# Patient Record
Sex: MALE | Race: WHITE | Employment: FULL TIME | URBAN - NONMETROPOLITAN AREA
[De-identification: names, ages, dates, MRNs, and addresses within clinical notes are randomized per-mention and may not be internally consistent; named-entity substitution may affect disease eponyms.]

---

## 2017-01-06 ENCOUNTER — HOSPITAL ENCOUNTER (OUTPATIENT)
Dept: GENERAL RADIOLOGY | Age: 47
Discharge: HOME | End: 2017-01-06
Payer: COMMERCIAL

## 2017-01-06 ENCOUNTER — OFFICE VISIT (OUTPATIENT)
Dept: FAMILY MEDICINE CLINIC | Age: 47
End: 2017-01-06
Payer: COMMERCIAL

## 2017-01-06 VITALS
SYSTOLIC BLOOD PRESSURE: 130 MMHG | HEIGHT: 72 IN | WEIGHT: 199 LBS | RESPIRATION RATE: 16 BRPM | TEMPERATURE: 98.3 F | HEART RATE: 91 BPM | OXYGEN SATURATION: 98 % | DIASTOLIC BLOOD PRESSURE: 86 MMHG | BODY MASS INDEX: 26.95 KG/M2

## 2017-01-06 DIAGNOSIS — M25.511 ACUTE PAIN OF RIGHT SHOULDER: Primary | ICD-10-CM

## 2017-01-06 DIAGNOSIS — M54.2 NECK PAIN ON RIGHT SIDE: ICD-10-CM

## 2017-01-06 DIAGNOSIS — M25.511 ACUTE PAIN OF RIGHT SHOULDER: ICD-10-CM

## 2017-01-06 PROCEDURE — 99213 OFFICE O/P EST LOW 20 MIN: CPT | Performed by: NURSE PRACTITIONER

## 2017-01-06 PROCEDURE — 72040 X-RAY EXAM NECK SPINE 2-3 VW: CPT

## 2017-01-06 RX ORDER — METHYLPREDNISOLONE 4 MG/1
TABLET ORAL
Qty: 1 KIT | Refills: 0 | Status: SHIPPED | OUTPATIENT
Start: 2017-01-06 | End: 2017-01-13

## 2017-01-06 RX ORDER — OXCARBAZEPINE 600 MG/1
600 TABLET, FILM COATED ORAL 2 TIMES DAILY
COMMUNITY
End: 2017-01-13 | Stop reason: DRUGHIGH

## 2017-01-10 DIAGNOSIS — M50.30 DEGENERATIVE CERVICAL DISC: ICD-10-CM

## 2017-01-10 DIAGNOSIS — M25.511 ACUTE PAIN OF RIGHT SHOULDER: Primary | ICD-10-CM

## 2017-01-13 ENCOUNTER — HOSPITAL ENCOUNTER (OUTPATIENT)
Dept: GENERAL RADIOLOGY | Age: 47
Discharge: HOME | End: 2017-01-13
Payer: OTHER GOVERNMENT

## 2017-01-13 ENCOUNTER — OFFICE VISIT (OUTPATIENT)
Dept: FAMILY MEDICINE CLINIC | Age: 47
End: 2017-01-13
Payer: OTHER GOVERNMENT

## 2017-01-13 VITALS
HEART RATE: 86 BPM | DIASTOLIC BLOOD PRESSURE: 92 MMHG | SYSTOLIC BLOOD PRESSURE: 140 MMHG | RESPIRATION RATE: 16 BRPM | HEIGHT: 72 IN | BODY MASS INDEX: 26.95 KG/M2 | WEIGHT: 199 LBS | OXYGEN SATURATION: 97 % | TEMPERATURE: 97.8 F

## 2017-01-13 DIAGNOSIS — M25.511 ACUTE PAIN OF RIGHT SHOULDER: ICD-10-CM

## 2017-01-13 DIAGNOSIS — M54.2 NECK PAIN, ACUTE: Primary | ICD-10-CM

## 2017-01-13 PROCEDURE — 73030 X-RAY EXAM OF SHOULDER: CPT

## 2017-01-13 PROCEDURE — 99213 OFFICE O/P EST LOW 20 MIN: CPT | Performed by: NURSE PRACTITIONER

## 2017-01-13 PROCEDURE — 96372 THER/PROPH/DIAG INJ SC/IM: CPT | Performed by: NURSE PRACTITIONER

## 2017-01-13 RX ORDER — DICLOFENAC POTASSIUM 50 MG/1
50 TABLET, FILM COATED ORAL 3 TIMES DAILY
Qty: 90 TABLET | Refills: 3 | Status: SHIPPED | OUTPATIENT
Start: 2017-01-13 | End: 2017-01-20

## 2017-01-13 RX ORDER — OMEPRAZOLE 20 MG/1
20 CAPSULE, DELAYED RELEASE ORAL DAILY
Qty: 30 CAPSULE | Refills: 3 | Status: SHIPPED | OUTPATIENT
Start: 2017-01-13 | End: 2019-09-29

## 2017-01-13 RX ORDER — ACETAMINOPHEN AND CODEINE PHOSPHATE 300; 60 MG/1; MG/1
1 TABLET ORAL EVERY 4 HOURS PRN
COMMUNITY
End: 2017-01-13 | Stop reason: ALTCHOICE

## 2017-01-13 RX ORDER — METHYLPREDNISOLONE 4 MG/1
TABLET ORAL
Qty: 1 KIT | Refills: 0 | Status: SHIPPED | OUTPATIENT
Start: 2017-01-13 | End: 2017-01-13 | Stop reason: SDUPTHER

## 2017-01-13 RX ORDER — TRIAMCINOLONE ACETONIDE 40 MG/ML
60 INJECTION, SUSPENSION INTRA-ARTICULAR; INTRAMUSCULAR ONCE
Status: COMPLETED | OUTPATIENT
Start: 2017-01-13 | End: 2017-01-13

## 2017-01-13 RX ORDER — GABAPENTIN 300 MG/1
300 CAPSULE ORAL NIGHTLY
Qty: 30 CAPSULE | Refills: 0 | Status: SHIPPED | OUTPATIENT
Start: 2017-01-13 | End: 2017-01-13 | Stop reason: SDUPTHER

## 2017-01-13 RX ORDER — DEXAMETHASONE SODIUM PHOSPHATE 4 MG/ML
4 INJECTION, SOLUTION INTRA-ARTICULAR; INTRALESIONAL; INTRAMUSCULAR; INTRAVENOUS; SOFT TISSUE ONCE
Status: COMPLETED | OUTPATIENT
Start: 2017-01-13 | End: 2017-01-13

## 2017-01-13 RX ORDER — METHYLPREDNISOLONE 4 MG/1
TABLET ORAL
Qty: 1 KIT | Refills: 0 | Status: SHIPPED | OUTPATIENT
Start: 2017-01-13 | End: 2017-01-20 | Stop reason: ALTCHOICE

## 2017-01-13 RX ORDER — DICLOFENAC POTASSIUM 50 MG/1
50 TABLET, FILM COATED ORAL 3 TIMES DAILY
Qty: 90 TABLET | Refills: 3 | Status: SHIPPED | OUTPATIENT
Start: 2017-01-13 | End: 2017-01-13 | Stop reason: SDUPTHER

## 2017-01-13 RX ORDER — OXCARBAZEPINE 600 MG/1
600 TABLET, FILM COATED ORAL DAILY
Qty: 60 TABLET | Status: SHIPPED | COMMUNITY
Start: 2017-01-13 | End: 2017-02-24

## 2017-01-13 RX ORDER — HYDROCODONE BITARTRATE AND ACETAMINOPHEN 5; 325 MG/1; MG/1
1 TABLET ORAL EVERY 6 HOURS PRN
Qty: 30 TABLET | Refills: 0 | Status: SHIPPED | OUTPATIENT
Start: 2017-01-13 | End: 2017-01-20 | Stop reason: SDUPTHER

## 2017-01-13 RX ORDER — GABAPENTIN 300 MG/1
300 CAPSULE ORAL NIGHTLY
Qty: 30 CAPSULE | Refills: 0 | Status: SHIPPED | OUTPATIENT
Start: 2017-01-13 | End: 2017-02-10 | Stop reason: DRUGHIGH

## 2017-01-13 RX ADMIN — TRIAMCINOLONE ACETONIDE 60 MG: 40 INJECTION, SUSPENSION INTRA-ARTICULAR; INTRAMUSCULAR at 09:28

## 2017-01-13 RX ADMIN — DEXAMETHASONE SODIUM PHOSPHATE 4 MG: 4 INJECTION, SOLUTION INTRA-ARTICULAR; INTRALESIONAL; INTRAMUSCULAR; INTRAVENOUS; SOFT TISSUE at 09:27

## 2017-01-17 ENCOUNTER — TELEPHONE (OUTPATIENT)
Dept: FAMILY MEDICINE CLINIC | Age: 47
End: 2017-01-17

## 2017-01-20 ENCOUNTER — OFFICE VISIT (OUTPATIENT)
Dept: FAMILY MEDICINE CLINIC | Age: 47
End: 2017-01-20
Payer: OTHER GOVERNMENT

## 2017-01-20 VITALS
BODY MASS INDEX: 26.55 KG/M2 | HEART RATE: 84 BPM | RESPIRATION RATE: 16 BRPM | TEMPERATURE: 98.9 F | SYSTOLIC BLOOD PRESSURE: 136 MMHG | DIASTOLIC BLOOD PRESSURE: 88 MMHG | WEIGHT: 196 LBS | OXYGEN SATURATION: 98 % | HEIGHT: 72 IN

## 2017-01-20 DIAGNOSIS — M25.511 ACUTE PAIN OF RIGHT SHOULDER: Primary | ICD-10-CM

## 2017-01-20 DIAGNOSIS — M47.812 CERVICAL SPINE ARTHRITIS: ICD-10-CM

## 2017-01-20 PROCEDURE — 99213 OFFICE O/P EST LOW 20 MIN: CPT | Performed by: NURSE PRACTITIONER

## 2017-01-20 RX ORDER — GABAPENTIN 300 MG/1
300 CAPSULE ORAL 2 TIMES DAILY
Qty: 60 CAPSULE | Refills: 3 | Status: SHIPPED | OUTPATIENT
Start: 2017-01-20 | End: 2017-02-03 | Stop reason: SDUPTHER

## 2017-01-20 RX ORDER — HYDROCODONE BITARTRATE AND ACETAMINOPHEN 5; 325 MG/1; MG/1
1 TABLET ORAL EVERY 6 HOURS PRN
Qty: 30 TABLET | Refills: 0 | Status: SHIPPED | OUTPATIENT
Start: 2017-01-20 | End: 2017-01-27

## 2017-01-31 RX ORDER — HYDROCODONE BITARTRATE AND ACETAMINOPHEN 5; 325 MG/1; MG/1
1 TABLET ORAL EVERY 8 HOURS PRN
Qty: 30 TABLET | Refills: 0 | Status: SHIPPED | OUTPATIENT
Start: 2017-01-31 | End: 2017-02-07

## 2017-02-03 DIAGNOSIS — M25.511 ACUTE PAIN OF RIGHT SHOULDER: ICD-10-CM

## 2017-02-03 RX ORDER — GABAPENTIN 300 MG/1
300 CAPSULE ORAL 2 TIMES DAILY
Qty: 60 CAPSULE | Refills: 0 | Status: SHIPPED | OUTPATIENT
Start: 2017-02-03 | End: 2018-04-13

## 2017-02-10 ENCOUNTER — OFFICE VISIT (OUTPATIENT)
Dept: FAMILY MEDICINE CLINIC | Age: 47
End: 2017-02-10
Payer: OTHER GOVERNMENT

## 2017-02-10 VITALS
HEIGHT: 72 IN | RESPIRATION RATE: 16 BRPM | SYSTOLIC BLOOD PRESSURE: 126 MMHG | BODY MASS INDEX: 26.55 KG/M2 | WEIGHT: 196 LBS | OXYGEN SATURATION: 98 % | HEART RATE: 78 BPM | TEMPERATURE: 98.4 F | DIASTOLIC BLOOD PRESSURE: 88 MMHG

## 2017-02-10 DIAGNOSIS — M25.511 ACUTE PAIN OF RIGHT SHOULDER: Primary | ICD-10-CM

## 2017-02-10 PROCEDURE — 99212 OFFICE O/P EST SF 10 MIN: CPT | Performed by: NURSE PRACTITIONER

## 2017-02-10 RX ORDER — HYDROCODONE BITARTRATE AND ACETAMINOPHEN 7.5; 325 MG/1; MG/1
1 TABLET ORAL 2 TIMES DAILY PRN
Qty: 30 TABLET | Refills: 0 | Status: SHIPPED | OUTPATIENT
Start: 2017-02-10 | End: 2017-02-17

## 2017-02-10 ASSESSMENT — ENCOUNTER SYMPTOMS
ALLERGIC/IMMUNOLOGIC NEGATIVE: 1
EYES NEGATIVE: 1
GASTROINTESTINAL NEGATIVE: 1
RESPIRATORY NEGATIVE: 1

## 2017-02-17 ENCOUNTER — TELEPHONE (OUTPATIENT)
Dept: FAMILY MEDICINE CLINIC | Age: 47
End: 2017-02-17

## 2017-02-22 ENCOUNTER — TELEPHONE (OUTPATIENT)
Dept: FAMILY MEDICINE CLINIC | Age: 47
End: 2017-02-22

## 2017-02-24 ENCOUNTER — OFFICE VISIT (OUTPATIENT)
Dept: FAMILY MEDICINE CLINIC | Age: 47
End: 2017-02-24
Payer: OTHER GOVERNMENT

## 2017-02-24 VITALS
DIASTOLIC BLOOD PRESSURE: 88 MMHG | HEART RATE: 70 BPM | WEIGHT: 196 LBS | SYSTOLIC BLOOD PRESSURE: 128 MMHG | TEMPERATURE: 98.4 F | RESPIRATION RATE: 16 BRPM | OXYGEN SATURATION: 98 % | BODY MASS INDEX: 26.58 KG/M2

## 2017-02-24 DIAGNOSIS — Z79.899 MEDICATION MANAGEMENT: ICD-10-CM

## 2017-02-24 DIAGNOSIS — M54.12 RIGHT CERVICAL RADICULOPATHY: ICD-10-CM

## 2017-02-24 DIAGNOSIS — M47.12 SPONDYLOSIS OF CERVICAL SPINE WITH MYELOPATHY: Primary | ICD-10-CM

## 2017-02-24 LAB
AMPHETAMINE SCREEN, URINE: NEGATIVE
BARBITURATE SCREEN URINE: NEGATIVE
BENZODIAZEPINE SCREEN, URINE: NEGATIVE
CANNABINOID SCREEN URINE: POSITIVE
COCAINE METABOLITE SCREEN URINE: NEGATIVE
Lab: ABNORMAL
OPIATE SCREEN URINE: POSITIVE

## 2017-02-24 PROCEDURE — 99213 OFFICE O/P EST LOW 20 MIN: CPT | Performed by: NURSE PRACTITIONER

## 2017-02-24 RX ORDER — HYDROCODONE BITARTRATE AND ACETAMINOPHEN 7.5; 325 MG/1; MG/1
1 TABLET ORAL EVERY 12 HOURS PRN
Qty: 60 TABLET | Refills: 0 | Status: SHIPPED | OUTPATIENT
Start: 2017-02-24 | End: 2018-04-13

## 2017-02-24 RX ORDER — MELOXICAM 15 MG/1
15 TABLET ORAL DAILY
Qty: 30 TABLET | Refills: 3 | Status: SHIPPED | OUTPATIENT
Start: 2017-02-24 | End: 2017-03-03

## 2017-02-24 RX ORDER — HYDROCODONE BITARTRATE AND ACETAMINOPHEN 7.5; 325 MG/1; MG/1
1 TABLET ORAL EVERY 6 HOURS PRN
COMMUNITY
End: 2017-02-24 | Stop reason: SDUPTHER

## 2017-02-24 RX ORDER — METHYLPREDNISOLONE 4 MG/1
TABLET ORAL
Qty: 1 KIT | Refills: 0 | Status: SHIPPED | OUTPATIENT
Start: 2017-02-24 | End: 2017-03-03

## 2017-02-28 ENCOUNTER — TELEPHONE (OUTPATIENT)
Dept: FAMILY MEDICINE CLINIC | Age: 47
End: 2017-02-28

## 2017-02-28 DIAGNOSIS — M47.12 SPONDYLOSIS OF CERVICAL SPINE WITH MYELOPATHY: Primary | ICD-10-CM

## 2017-02-28 DIAGNOSIS — M54.12 RIGHT CERVICAL RADICULOPATHY: ICD-10-CM

## 2017-03-03 ENCOUNTER — OFFICE VISIT (OUTPATIENT)
Dept: NEUROLOGY | Age: 47
End: 2017-03-03
Payer: COMMERCIAL

## 2017-03-03 VITALS
SYSTOLIC BLOOD PRESSURE: 136 MMHG | DIASTOLIC BLOOD PRESSURE: 84 MMHG | OXYGEN SATURATION: 94 % | WEIGHT: 198.25 LBS | HEART RATE: 77 BPM | BODY MASS INDEX: 26.85 KG/M2 | HEIGHT: 72 IN

## 2017-03-03 DIAGNOSIS — R07.9 CHEST PAIN, UNSPECIFIED TYPE: ICD-10-CM

## 2017-03-03 DIAGNOSIS — M79.601 PAIN IN RIGHT ARM: Primary | ICD-10-CM

## 2017-03-03 DIAGNOSIS — M54.12 CERVICAL RADICULOPATHY: ICD-10-CM

## 2017-03-03 PROCEDURE — 99204 OFFICE O/P NEW MOD 45 MIN: CPT | Performed by: PSYCHIATRY & NEUROLOGY

## 2017-03-08 ENCOUNTER — TELEPHONE (OUTPATIENT)
Dept: NEUROLOGY | Age: 47
End: 2017-03-08

## 2017-03-13 ENCOUNTER — OFFICE VISIT (OUTPATIENT)
Dept: PRIMARY CARE CLINIC | Age: 47
End: 2017-03-13
Payer: OTHER GOVERNMENT

## 2017-03-13 VITALS
BODY MASS INDEX: 28.04 KG/M2 | HEIGHT: 72 IN | HEART RATE: 68 BPM | OXYGEN SATURATION: 96 % | RESPIRATION RATE: 18 BRPM | TEMPERATURE: 98 F | SYSTOLIC BLOOD PRESSURE: 138 MMHG | DIASTOLIC BLOOD PRESSURE: 88 MMHG | WEIGHT: 207 LBS

## 2017-03-13 DIAGNOSIS — M50.30 DEGENERATIVE DISC DISEASE, CERVICAL: Primary | ICD-10-CM

## 2017-03-13 PROCEDURE — 99202 OFFICE O/P NEW SF 15 MIN: CPT | Performed by: FAMILY MEDICINE

## 2017-03-15 ASSESSMENT — ENCOUNTER SYMPTOMS
COLOR CHANGE: 0
NAUSEA: 0
ABDOMINAL PAIN: 0
VOMITING: 0
BACK PAIN: 0

## 2017-09-18 ENCOUNTER — TRANSCRIBE ORDERS (OUTPATIENT)
Dept: ADMINISTRATIVE | Facility: HOSPITAL | Age: 47
End: 2017-09-18

## 2017-09-18 DIAGNOSIS — R51.9 GENERALIZED HEADACHES: Primary | ICD-10-CM

## 2017-09-21 ENCOUNTER — HOSPITAL ENCOUNTER (OUTPATIENT)
Dept: CT IMAGING | Facility: HOSPITAL | Age: 47
Discharge: HOME | End: 2017-09-21
Admitting: FAMILY MEDICINE

## 2017-09-21 DIAGNOSIS — R51.9 GENERALIZED HEADACHES: ICD-10-CM

## 2017-09-21 PROCEDURE — 70450 CT HEAD/BRAIN W/O DYE: CPT

## 2017-11-01 ENCOUNTER — OFFICE VISIT (OUTPATIENT)
Dept: PRIMARY CARE CLINIC | Age: 47
End: 2017-11-01
Payer: COMMERCIAL

## 2017-11-01 VITALS
DIASTOLIC BLOOD PRESSURE: 91 MMHG | HEART RATE: 97 BPM | TEMPERATURE: 97.2 F | HEIGHT: 72 IN | WEIGHT: 205.6 LBS | RESPIRATION RATE: 18 BRPM | BODY MASS INDEX: 27.85 KG/M2 | SYSTOLIC BLOOD PRESSURE: 120 MMHG | OXYGEN SATURATION: 98 %

## 2017-11-01 DIAGNOSIS — J06.9 URI, ACUTE: ICD-10-CM

## 2017-11-01 DIAGNOSIS — M25.511 ACUTE PAIN OF RIGHT SHOULDER: ICD-10-CM

## 2017-11-01 DIAGNOSIS — M79.642 PAIN OF LEFT HAND: Primary | ICD-10-CM

## 2017-11-01 DIAGNOSIS — M54.6 ACUTE RIGHT-SIDED THORACIC BACK PAIN: ICD-10-CM

## 2017-11-01 DIAGNOSIS — S67.22XA CRUSHING INJURY OF LEFT HAND, INITIAL ENCOUNTER: ICD-10-CM

## 2017-11-01 PROCEDURE — 99214 OFFICE O/P EST MOD 30 MIN: CPT | Performed by: FAMILY MEDICINE

## 2017-11-01 RX ORDER — CETIRIZINE HYDROCHLORIDE 10 MG/1
10 TABLET ORAL DAILY
Qty: 30 TABLET | Refills: 0 | Status: SHIPPED | OUTPATIENT
Start: 2017-11-01 | End: 2018-04-13

## 2017-11-01 RX ORDER — TIZANIDINE 4 MG/1
4 TABLET ORAL EVERY 8 HOURS PRN
Qty: 60 TABLET | Refills: 2 | Status: SHIPPED | OUTPATIENT
Start: 2017-11-01 | End: 2018-04-13

## 2017-11-01 RX ORDER — BROMPHENIRAMINE MALEATE, PSEUDOEPHEDRINE HYDROCHLORIDE, AND DEXTROMETHORPHAN HYDROBROMIDE 2; 30; 10 MG/5ML; MG/5ML; MG/5ML
5 SYRUP ORAL 4 TIMES DAILY PRN
Qty: 240 ML | Refills: 0 | Status: SHIPPED | OUTPATIENT
Start: 2017-11-01 | End: 2018-04-13

## 2017-11-02 ASSESSMENT — ENCOUNTER SYMPTOMS
VOMITING: 0
NAUSEA: 0
SORE THROAT: 1
ABDOMINAL PAIN: 0
COLOR CHANGE: 0
BACK PAIN: 1
COUGH: 1

## 2017-11-02 NOTE — PROGRESS NOTES
CAPS Take 1 capsule by mouth once a week 16 capsule 0     No current facility-administered medications on file prior to visit. No Known Allergies    Review of Systems   Constitutional: Negative for activity change, appetite change and unexpected weight change. HENT: Positive for congestion and sore throat. Respiratory: Positive for cough. Cardiovascular: Negative for chest pain and palpitations. Gastrointestinal: Negative for abdominal pain, nausea and vomiting. Genitourinary: Negative for decreased urine volume and difficulty urinating. Musculoskeletal: Positive for arthralgias and back pain. Skin: Negative for color change and rash. Neurological: Negative for headaches. Psychiatric/Behavioral: Negative for behavioral problems, decreased concentration and sleep disturbance. Objective:   Physical Exam   Constitutional: He is oriented to person, place, and time. He appears well-developed and well-nourished. No distress. HENT:   Head: Normocephalic and atraumatic. Neck: Normal range of motion. Neck supple. No thyromegaly present. Cardiovascular: Normal rate, regular rhythm, normal heart sounds and intact distal pulses. Pulmonary/Chest: Effort normal and breath sounds normal. No respiratory distress. He has no wheezes. Abdominal: Soft. Bowel sounds are normal. There is no tenderness. Musculoskeletal:        Right shoulder: He exhibits tenderness (muscle tenderness around paraspinal muscles on right). Left hand: He exhibits tenderness (over middle finger and down into carpal bone. No loss of sensation or  strength). Normal sensation noted. Normal strength noted. Lymphadenopathy:     He has no cervical adenopathy. Neurological: He is alert and oriented to person, place, and time. He has normal strength. No sensory deficit. Skin: Skin is warm and dry. No rash noted. He is not diaphoretic. Psychiatric: He has a normal mood and affect.  His behavior is normal.

## 2017-11-13 DIAGNOSIS — M79.642 PAIN OF LEFT HAND: ICD-10-CM

## 2017-11-13 DIAGNOSIS — S67.22XA CRUSHING INJURY OF LEFT HAND, INITIAL ENCOUNTER: ICD-10-CM

## 2017-11-15 ENCOUNTER — TELEPHONE (OUTPATIENT)
Dept: PRIMARY CARE CLINIC | Age: 47
End: 2017-11-15

## 2017-11-15 NOTE — TELEPHONE ENCOUNTER
Pt notified of normal left hand xray. He states his left hand is still swollen and painful and wants to know what you want to do next? ??

## 2018-04-13 ENCOUNTER — OFFICE VISIT (OUTPATIENT)
Dept: PRIMARY CARE CLINIC | Age: 48
End: 2018-04-13
Payer: COMMERCIAL

## 2018-04-13 VITALS
RESPIRATION RATE: 16 BRPM | TEMPERATURE: 96.4 F | OXYGEN SATURATION: 98 % | HEART RATE: 111 BPM | SYSTOLIC BLOOD PRESSURE: 110 MMHG | DIASTOLIC BLOOD PRESSURE: 62 MMHG | WEIGHT: 193.8 LBS | BODY MASS INDEX: 26.25 KG/M2 | HEIGHT: 72 IN

## 2018-04-13 DIAGNOSIS — K64.5 PERIANAL VENOUS THROMBOSIS: Primary | ICD-10-CM

## 2018-04-13 PROCEDURE — 46320 REMOVAL OF HEMORRHOID CLOT: CPT | Performed by: NURSE PRACTITIONER

## 2018-04-13 RX ORDER — DULOXETIN HYDROCHLORIDE 30 MG/1
30 CAPSULE, DELAYED RELEASE ORAL DAILY
COMMUNITY

## 2018-04-13 RX ORDER — HYDROCODONE BITARTRATE AND ACETAMINOPHEN 5; 325 MG/1; MG/1
1 TABLET ORAL EVERY 6 HOURS PRN
Qty: 10 TABLET | Refills: 0 | Status: SHIPPED | OUTPATIENT
Start: 2018-04-13 | End: 2018-04-16

## 2018-04-13 RX ORDER — CEPHALEXIN 500 MG/1
500 CAPSULE ORAL 3 TIMES DAILY
Qty: 21 CAPSULE | Refills: 0 | Status: SHIPPED | OUTPATIENT
Start: 2018-04-13 | End: 2018-04-20

## 2018-04-16 ENCOUNTER — OFFICE VISIT (OUTPATIENT)
Dept: SURGERY | Age: 48
End: 2018-04-16
Payer: COMMERCIAL

## 2018-04-16 ENCOUNTER — OFFICE VISIT (OUTPATIENT)
Dept: PRIMARY CARE CLINIC | Age: 48
End: 2018-04-16

## 2018-04-16 VITALS
SYSTOLIC BLOOD PRESSURE: 110 MMHG | RESPIRATION RATE: 16 BRPM | OXYGEN SATURATION: 96 % | HEART RATE: 78 BPM | WEIGHT: 194 LBS | TEMPERATURE: 96.8 F | DIASTOLIC BLOOD PRESSURE: 70 MMHG | HEIGHT: 72 IN | BODY MASS INDEX: 26.28 KG/M2

## 2018-04-16 VITALS
WEIGHT: 193 LBS | BODY MASS INDEX: 26.14 KG/M2 | OXYGEN SATURATION: 93 % | HEART RATE: 64 BPM | TEMPERATURE: 97.3 F | SYSTOLIC BLOOD PRESSURE: 122 MMHG | DIASTOLIC BLOOD PRESSURE: 79 MMHG | HEIGHT: 72 IN

## 2018-04-16 DIAGNOSIS — K64.5 THROMBOSED EXTERNAL HEMORRHOID: Primary | ICD-10-CM

## 2018-04-16 DIAGNOSIS — K64.5 EXTERNAL THROMBOSED HEMORRHOIDS: Primary | ICD-10-CM

## 2018-04-16 PROCEDURE — 46320 REMOVAL OF HEMORRHOID CLOT: CPT | Performed by: PHYSICIAN ASSISTANT

## 2018-04-16 PROCEDURE — 99024 POSTOP FOLLOW-UP VISIT: CPT | Performed by: NURSE PRACTITIONER

## 2018-04-16 PROCEDURE — 99024 POSTOP FOLLOW-UP VISIT: CPT | Performed by: PHYSICIAN ASSISTANT

## 2018-04-16 ASSESSMENT — PATIENT HEALTH QUESTIONNAIRE - PHQ9
SUM OF ALL RESPONSES TO PHQ9 QUESTIONS 1 & 2: 0
2. FEELING DOWN, DEPRESSED OR HOPELESS: 0
1. LITTLE INTEREST OR PLEASURE IN DOING THINGS: 0
SUM OF ALL RESPONSES TO PHQ QUESTIONS 1-9: 0

## 2019-09-29 ENCOUNTER — APPOINTMENT (OUTPATIENT)
Dept: GENERAL RADIOLOGY | Age: 49
End: 2019-09-29
Payer: COMMERCIAL

## 2019-09-29 ENCOUNTER — HOSPITAL ENCOUNTER (EMERGENCY)
Age: 49
Discharge: HOME | End: 2019-09-29
Payer: COMMERCIAL

## 2019-09-29 ENCOUNTER — APPOINTMENT (OUTPATIENT)
Dept: CT IMAGING | Age: 49
End: 2019-09-29
Payer: COMMERCIAL

## 2019-09-29 VITALS
HEIGHT: 72 IN | RESPIRATION RATE: 16 BRPM | SYSTOLIC BLOOD PRESSURE: 119 MMHG | DIASTOLIC BLOOD PRESSURE: 68 MMHG | OXYGEN SATURATION: 94 % | HEART RATE: 78 BPM | WEIGHT: 184 LBS | BODY MASS INDEX: 24.92 KG/M2 | TEMPERATURE: 97.7 F

## 2019-09-29 DIAGNOSIS — R55 SYNCOPE AND COLLAPSE: Primary | ICD-10-CM

## 2019-09-29 LAB
ALBUMIN SERPL-MCNC: 4.3 G/DL (ref 3.5–5.2)
ALP BLD-CCNC: 57 U/L (ref 40–130)
ALT SERPL-CCNC: 19 U/L (ref 5–41)
ANION GAP SERPL CALCULATED.3IONS-SCNC: 14 MMOL/L (ref 7–19)
AST SERPL-CCNC: 15 U/L (ref 5–40)
BASOPHILS ABSOLUTE: 0.1 K/UL (ref 0–0.2)
BASOPHILS RELATIVE PERCENT: 0.4 % (ref 0–1)
BILIRUB SERPL-MCNC: <0.2 MG/DL (ref 0.2–1.2)
BUN BLDV-MCNC: 13 MG/DL (ref 6–20)
CALCIUM SERPL-MCNC: 9.4 MG/DL (ref 8.6–10)
CHLORIDE BLD-SCNC: 102 MMOL/L (ref 98–111)
CO2: 20 MMOL/L (ref 22–29)
CREAT SERPL-MCNC: 1 MG/DL (ref 0.5–1.2)
EOSINOPHILS ABSOLUTE: 0.2 K/UL (ref 0–0.6)
EOSINOPHILS RELATIVE PERCENT: 0.9 % (ref 0–5)
ETHANOL: 81 MG/DL (ref 0–0.08)
GFR NON-AFRICAN AMERICAN: >60
GLUCOSE BLD-MCNC: 128 MG/DL (ref 74–109)
GLUCOSE BLD-MCNC: 137 MG/DL (ref 70–99)
HCT VFR BLD CALC: 51.3 % (ref 42–52)
HEMOGLOBIN: 17.7 G/DL (ref 14–18)
IMMATURE GRANULOCYTES #: 0.1 K/UL
LACTIC ACID: 2.3 MMOL/L (ref 0.5–1.9)
LYMPHOCYTES ABSOLUTE: 1.8 K/UL (ref 1.1–4.5)
LYMPHOCYTES RELATIVE PERCENT: 9.2 % (ref 20–40)
MCH RBC QN AUTO: 33.6 PG (ref 27–31)
MCHC RBC AUTO-ENTMCNC: 34.5 G/DL (ref 33–37)
MCV RBC AUTO: 97.3 FL (ref 80–94)
MONOCYTES ABSOLUTE: 1.4 K/UL (ref 0–0.9)
MONOCYTES RELATIVE PERCENT: 7.3 % (ref 0–10)
NEUTROPHILS ABSOLUTE: 16.2 K/UL (ref 1.5–7.5)
NEUTROPHILS RELATIVE PERCENT: 81.7 % (ref 50–65)
PDW BLD-RTO: 12.3 % (ref 11.5–14.5)
PERFORMED ON: ABNORMAL
PLATELET # BLD: 204 K/UL (ref 130–400)
PMV BLD AUTO: 9.9 FL (ref 9.4–12.4)
POTASSIUM REFLEX MAGNESIUM: 3.9 MMOL/L (ref 3.5–5)
RBC # BLD: 5.27 M/UL (ref 4.7–6.1)
SODIUM BLD-SCNC: 136 MMOL/L (ref 136–145)
TOTAL CK: 99 U/L (ref 39–308)
TOTAL PROTEIN: 6.9 G/DL (ref 6.6–8.7)
TROPONIN: <0.01 NG/ML (ref 0–0.03)
WBC # BLD: 19.8 K/UL (ref 4.8–10.8)

## 2019-09-29 PROCEDURE — 85025 COMPLETE CBC W/AUTO DIFF WBC: CPT

## 2019-09-29 PROCEDURE — 2580000003 HC RX 258: Performed by: NURSE PRACTITIONER

## 2019-09-29 PROCEDURE — 83605 ASSAY OF LACTIC ACID: CPT

## 2019-09-29 PROCEDURE — 71045 X-RAY EXAM CHEST 1 VIEW: CPT

## 2019-09-29 PROCEDURE — 93005 ELECTROCARDIOGRAM TRACING: CPT

## 2019-09-29 PROCEDURE — 82550 ASSAY OF CK (CPK): CPT

## 2019-09-29 PROCEDURE — 82948 REAGENT STRIP/BLOOD GLUCOSE: CPT

## 2019-09-29 PROCEDURE — 70450 CT HEAD/BRAIN W/O DYE: CPT

## 2019-09-29 PROCEDURE — G0480 DRUG TEST DEF 1-7 CLASSES: HCPCS

## 2019-09-29 PROCEDURE — 80053 COMPREHEN METABOLIC PANEL: CPT

## 2019-09-29 PROCEDURE — 84484 ASSAY OF TROPONIN QUANT: CPT

## 2019-09-29 PROCEDURE — 99284 EMERGENCY DEPT VISIT MOD MDM: CPT

## 2019-09-29 PROCEDURE — 36415 COLL VENOUS BLD VENIPUNCTURE: CPT

## 2019-09-29 RX ORDER — MELOXICAM 15 MG/1
15 TABLET ORAL DAILY
Status: ON HOLD | COMMUNITY
End: 2020-10-09 | Stop reason: HOSPADM

## 2019-09-29 RX ORDER — GABAPENTIN 300 MG/1
100 CAPSULE ORAL
COMMUNITY
End: 2020-01-03 | Stop reason: SINTOL

## 2019-09-29 RX ORDER — MOMETASONE FUROATE 50 UG/1
2 SPRAY, METERED NASAL DAILY
COMMUNITY
End: 2019-11-14 | Stop reason: ALTCHOICE

## 2019-09-29 RX ORDER — FLUTICASONE PROPIONATE 50 MCG
1 SPRAY, SUSPENSION (ML) NASAL DAILY
COMMUNITY
End: 2019-11-14

## 2019-09-29 RX ORDER — 0.9 % SODIUM CHLORIDE 0.9 %
1000 INTRAVENOUS SOLUTION INTRAVENOUS ONCE
Status: COMPLETED | OUTPATIENT
Start: 2019-09-29 | End: 2019-09-29

## 2019-09-29 RX ADMIN — SODIUM CHLORIDE 1000 ML: 9 INJECTION, SOLUTION INTRAVENOUS at 22:05

## 2019-09-29 ASSESSMENT — ENCOUNTER SYMPTOMS
APNEA: 0
COLOR CHANGE: 0
NAUSEA: 0
CONSTIPATION: 0
WHEEZING: 0
ABDOMINAL PAIN: 0
EYE PAIN: 0
RECTAL PAIN: 0
CHEST TIGHTNESS: 0
EYE REDNESS: 0
ABDOMINAL DISTENTION: 0
BACK PAIN: 0
EYE DISCHARGE: 0
BLOOD IN STOOL: 0
SORE THROAT: 0
STRIDOR: 0
SHORTNESS OF BREATH: 0
SINUS PAIN: 0
VOMITING: 0
DIARRHEA: 0
PHOTOPHOBIA: 0

## 2019-09-29 ASSESSMENT — PAIN DESCRIPTION - LOCATION: LOCATION: BACK

## 2019-09-29 ASSESSMENT — PAIN DESCRIPTION - PAIN TYPE: TYPE: ACUTE PAIN

## 2019-09-29 ASSESSMENT — PAIN SCALES - GENERAL: PAINLEVEL_OUTOF10: 4

## 2019-10-05 LAB
EKG P AXIS: 64 DEGREES
EKG P-R INTERVAL: 150 MS
EKG Q-T INTERVAL: 362 MS
EKG QRS DURATION: 88 MS
EKG QTC CALCULATION (BAZETT): 400 MS
EKG T AXIS: 73 DEGREES

## 2019-11-14 ENCOUNTER — OFFICE VISIT (OUTPATIENT)
Dept: NEUROLOGY | Age: 49
End: 2019-11-14
Payer: OTHER GOVERNMENT

## 2019-11-14 VITALS
DIASTOLIC BLOOD PRESSURE: 86 MMHG | HEART RATE: 76 BPM | WEIGHT: 190 LBS | BODY MASS INDEX: 25.73 KG/M2 | SYSTOLIC BLOOD PRESSURE: 142 MMHG | HEIGHT: 72 IN

## 2019-11-14 DIAGNOSIS — R41.89 UNRESPONSIVE: Primary | ICD-10-CM

## 2019-11-14 PROCEDURE — 99215 OFFICE O/P EST HI 40 MIN: CPT | Performed by: PSYCHIATRY & NEUROLOGY

## 2019-12-06 ENCOUNTER — HOSPITAL ENCOUNTER (OUTPATIENT)
Dept: NEUROLOGY | Age: 49
Discharge: HOME | End: 2019-12-06
Payer: OTHER GOVERNMENT

## 2019-12-06 ENCOUNTER — HOSPITAL ENCOUNTER (OUTPATIENT)
Dept: CT IMAGING | Age: 49
Discharge: HOME | End: 2019-12-06
Payer: OTHER GOVERNMENT

## 2019-12-06 ENCOUNTER — HOSPITAL ENCOUNTER (OUTPATIENT)
Dept: MRI IMAGING | Age: 49
Discharge: HOME | End: 2019-12-06
Payer: OTHER GOVERNMENT

## 2019-12-06 DIAGNOSIS — R41.89 UNRESPONSIVE: ICD-10-CM

## 2019-12-06 PROCEDURE — 95813 EEG EXTND MNTR 61-119 MIN: CPT | Performed by: PSYCHIATRY & NEUROLOGY

## 2019-12-06 PROCEDURE — 70498 CT ANGIOGRAPHY NECK: CPT

## 2019-12-06 PROCEDURE — 70496 CT ANGIOGRAPHY HEAD: CPT

## 2019-12-06 PROCEDURE — 70551 MRI BRAIN STEM W/O DYE: CPT

## 2019-12-06 PROCEDURE — 6360000004 HC RX CONTRAST MEDICATION: Performed by: PSYCHIATRY & NEUROLOGY

## 2019-12-06 PROCEDURE — 95813 EEG EXTND MNTR 61-119 MIN: CPT

## 2019-12-06 RX ADMIN — IOPAMIDOL 90 ML: 755 INJECTION, SOLUTION INTRAVENOUS at 08:13

## 2020-01-03 ENCOUNTER — OFFICE VISIT (OUTPATIENT)
Dept: NEUROLOGY | Age: 50
End: 2020-01-03
Payer: OTHER GOVERNMENT

## 2020-01-03 VITALS
WEIGHT: 186 LBS | SYSTOLIC BLOOD PRESSURE: 138 MMHG | DIASTOLIC BLOOD PRESSURE: 80 MMHG | HEIGHT: 72 IN | BODY MASS INDEX: 25.19 KG/M2 | HEART RATE: 85 BPM

## 2020-01-03 PROCEDURE — 99214 OFFICE O/P EST MOD 30 MIN: CPT | Performed by: PSYCHIATRY & NEUROLOGY

## 2020-01-03 NOTE — PROGRESS NOTES
Chief Complaint   Patient presents with    Follow-up     pt states he had a recent seizure        Saul Sanchez is a 44y.o. year old male who is seen for evaluation of right upper arm and pectoral pain in 2017. The patient indicates approximately 1 month prior to his initial visit he was at work. He was wearing a harness and lifting a cable over his head to the right. He twisted and turned his head and neck to the right and had a shooting pain in his upper arm. This moved into the right armpit and into the right pectoral region. This lasted several hours. Since that time he has had some aching pain in the right upper arm and pectoral region. This has improved. He was on Neurontin. He denied any clear weakness or numbness. His MRI of the cervical region revealed some uncinate spurring at C3-C4, extending into the right intravertebral foramen. He denied any weakness in the left arm. He denied involvement of the legs. He has been lost to follow up since 2017. End of 2019 and felt strange and fell back. Saying things that were incoherent for a few minutes. He then got wobbly and looked lethargic and fell back in chair and then leaned forward and throw up and then leaned back and passed out and then eyes tolled back then made strange noise and foam coming out of mouth, Then stiffened up and then limp. Thought he had . Episodes lasted 3-4 minutes. he was unresponsive for 3-4 minutes. Wife thumped him the chest and he started coming to. His blood pressure was 80/60. He was confused for a few hours and he does not recall was happened. Loss of bowel function. No new issues.     Active Ambulatory Problems     Diagnosis Date Noted    Vitamin D deficiency 2015    Pain in right arm 2017    Chest pain 2017    Cervical radiculopathy 2017    Unresponsive 2019     Resolved Ambulatory Problems     Diagnosis Date Noted    No Resolved Ambulatory Problems     Past Medical History: Diagnosis Date    Chronic back pain     Compression of sciatic nerve     Hypogonadism male        Past Surgical History:   Procedure Laterality Date    WRIST SURGERY         History reviewed. No pertinent family history. No Known Allergies    Social History     Socioeconomic History    Marital status:      Spouse name: Not on file    Number of children: Not on file    Years of education: Not on file    Highest education level: Not on file   Occupational History    Not on file   Social Needs    Financial resource strain: Not on file    Food insecurity:     Worry: Not on file     Inability: Not on file    Transportation needs:     Medical: Not on file     Non-medical: Not on file   Tobacco Use    Smoking status: Current Every Day Smoker     Packs/day: 1.00     Types: Cigarettes    Smokeless tobacco: Never Used   Substance and Sexual Activity    Alcohol use: Yes    Drug use: No    Sexual activity: Yes     Partners: Female   Lifestyle    Physical activity:     Days per week: Not on file     Minutes per session: Not on file    Stress: Not on file   Relationships    Social connections:     Talks on phone: Not on file     Gets together: Not on file     Attends Confucianism service: Not on file     Active member of club or organization: Not on file     Attends meetings of clubs or organizations: Not on file     Relationship status: Not on file    Intimate partner violence:     Fear of current or ex partner: Not on file     Emotionally abused: Not on file     Physically abused: Not on file     Forced sexual activity: Not on file   Other Topics Concern    Not on file   Social History Narrative    Not on file     Review of Systems     Constitutional - No fever or chills. yes diaphoresis or significant fatigue. HENT -  No tinnitus or significant hearing loss.   Eyes - no sudden vision change or eye pain  Respiratory - no significant shortness of breath or cough  Cardiovascular - no chest pain No palpitations or significant leg swelling  Gastrointestinal - no abdominal swelling or pain. Genitourinary - No difficulty urinating, dysuria  Musculoskeletal - yes back pain or myalgia. Skin - no color change or rash  Neurologic - yes seizures. No lateralizing weakness. Hematologic - no easy bruising or excessive bleeding. Psychiatric - yes severe anxiety or nervousness. All other review of systems are negative. Current Outpatient Medications   Medication Sig Dispense Refill    meloxicam (MOBIC) 15 MG tablet Take 15 mg by mouth daily      DULoxetine (CYMBALTA) 30 MG extended release capsule Take 30 mg by mouth daily       No current facility-administered medications for this visit. /80   Pulse 85   Ht 6' (1.829 m)   Wt 186 lb (84.4 kg)   BMI 25.23 kg/m²       Constitutional - well developed, well nourished. Eyes - conjunctiva normal.  Ear, nose, throat - No scars, masses, or lesions over external nose or ears, no atrophy of tongue  Neck-symmetric, no masses noted, no jugular vein distension  Respiration- chest wall appears symmetric, good expansion,   normal effort without use of accessory muscles  Musculoskeletal - no significant wasting of muscles noted, no bony deformities  Extremities-no clubbing, cyanosis or edema  Skin - warm, dry, and intact. No rash, erythema, or pallor.   Psychiatric - mood, affect, and behavior appear normal.      Neurological exam  Awake, alert, fluent oriented  appropriate affect  Attention and concentration appear appropriate  Recent and remote memory appears unremarkable  Speech normal without dysarthria  No clear issues with language of fund of knowledge    Cranial Nerve Exam     CN III, IV,VI-EOMI, No nystagmus, conjugate eye movements, no ptosis  CN VII-no facial assymetry        Motor Exam  antigravity throughout upper and lower extremities bilaterally    Tremors- no tremors in hands or head noted    Gait  Normal base and speed  No ataxia    No results found for: Paige Christie  Lab Results   Component Value Date    WBC 19.8 (H) 09/29/2019    HGB 17.7 09/29/2019    HCT 51.3 09/29/2019    MCV 97.3 (H) 09/29/2019     09/29/2019     Lab Results   Component Value Date     09/29/2019    K 3.9 09/29/2019     09/29/2019    CO2 20 (L) 09/29/2019    BUN 13 09/29/2019    CREATININE 1.0 09/29/2019    GLUCOSE 128 (H) 09/29/2019    CALCIUM 9.4 09/29/2019    PROT 6.9 09/29/2019    LABALBU 4.3 09/29/2019    BILITOT <0.2 09/29/2019    ALKPHOS 57 09/29/2019    AST 15 09/29/2019    ALT 19 09/29/2019    LABGLOM >60 09/29/2019       Impression   1. No evidence of acute infarct. 2. No signal abnormality within the brain. The temporal lobes are   symmetric with no signal abnormality. 3. Mucosal inflammatory changes of the paranasal sinuses. Signed by Dr Ricardo Ashby on 12/6/2019 10:06 AM         Impression   Negative CT angiogram of the cervical segment of the carotid arteries. There is no significant stenosis. Signed by Dr Cristel Soto on 12/6/2019 8:45 AM     Impression   Impression:    1. Negative CT angiogram at the level of [place]   2. Mucosal edema in the ethmoid air cells. .    Signed by Dr Cristel Soto on 12/6/2019 8:35 AM     Narrative     Monika Herrera MD     12/8/2019  8:32 AM  Patient:   Ky Mcintosh  MR#:    [id number]   Room:    Room/bed info not found   Date of Birth:   1970  Date of Progress Note: 12/8/2019  Time of Note                           8:32 AM  Consulting Physician:   Monika Herrera M.D. Attending Physician:  No att. providers found         This is a multichannel digital EEG recording using the   international 10-20 placement system. Technical Summary:     Background EEG activity: The occipital dominant rhythm is 9-10   Hz. There is much low to moderate voltage 7-9 Hz activity seen in   a generalized distribution intermixed with low voltage 18-22 Hz   activity.     Photic Stimulation: No abnormal

## 2020-02-05 ENCOUNTER — OFFICE VISIT (OUTPATIENT)
Dept: PRIMARY CARE CLINIC | Age: 50
End: 2020-02-05
Payer: OTHER GOVERNMENT

## 2020-02-05 VITALS
TEMPERATURE: 98.7 F | DIASTOLIC BLOOD PRESSURE: 80 MMHG | HEART RATE: 80 BPM | WEIGHT: 185 LBS | BODY MASS INDEX: 25.06 KG/M2 | SYSTOLIC BLOOD PRESSURE: 132 MMHG | HEIGHT: 72 IN | OXYGEN SATURATION: 96 % | RESPIRATION RATE: 20 BRPM

## 2020-02-05 PROCEDURE — 99214 OFFICE O/P EST MOD 30 MIN: CPT | Performed by: FAMILY MEDICINE

## 2020-02-05 RX ORDER — AMOXICILLIN 875 MG/1
875 TABLET, COATED ORAL 2 TIMES DAILY
Qty: 20 TABLET | Refills: 0 | Status: SHIPPED | OUTPATIENT
Start: 2020-02-05 | End: 2020-02-15

## 2020-02-05 RX ORDER — BENZONATATE 100 MG/1
100 CAPSULE ORAL 3 TIMES DAILY PRN
Qty: 30 CAPSULE | Refills: 0 | Status: SHIPPED | OUTPATIENT
Start: 2020-02-05 | End: 2020-02-12

## 2020-02-05 ASSESSMENT — PATIENT HEALTH QUESTIONNAIRE - PHQ9
SUM OF ALL RESPONSES TO PHQ QUESTIONS 1-9: 0
1. LITTLE INTEREST OR PLEASURE IN DOING THINGS: 0
2. FEELING DOWN, DEPRESSED OR HOPELESS: 0
SUM OF ALL RESPONSES TO PHQ QUESTIONS 1-9: 0
SUM OF ALL RESPONSES TO PHQ9 QUESTIONS 1 & 2: 0

## 2020-02-05 ASSESSMENT — ENCOUNTER SYMPTOMS
ABDOMINAL PAIN: 0
COUGH: 1
SINUS PAIN: 1
COLOR CHANGE: 0
RHINORRHEA: 0
SINUS PRESSURE: 1
NAUSEA: 0
DIARRHEA: 0
VOMITING: 0
CONSTIPATION: 0

## 2020-02-05 NOTE — LETTER
[facility] [address] [facility] [place] [address]  Phone: [phone]  Fax: [phone]    Alex Weber MD        February 5, 2020     Patient: Jesus Ames   YOB: 1970   Date of Visit: 2/5/2020       To Whom it May Concern:    Jesus Ames was seen in my clinic on 2/5/2020. He may return to work on 2/7/20. If you have any questions or concerns, please don't hesitate to call.     Sincerely,         Alex Weber MD

## 2020-05-12 ENCOUNTER — OFFICE VISIT (OUTPATIENT)
Dept: PRIMARY CARE CLINIC | Age: 50
End: 2020-05-12
Payer: COMMERCIAL

## 2020-05-12 ENCOUNTER — TELEPHONE (OUTPATIENT)
Dept: PRIMARY CARE CLINIC | Age: 50
End: 2020-05-12

## 2020-05-12 VITALS
TEMPERATURE: 98.3 F | WEIGHT: 188.4 LBS | HEART RATE: 82 BPM | DIASTOLIC BLOOD PRESSURE: 74 MMHG | SYSTOLIC BLOOD PRESSURE: 118 MMHG | BODY MASS INDEX: 25.52 KG/M2 | OXYGEN SATURATION: 96 % | RESPIRATION RATE: 18 BRPM | HEIGHT: 72 IN

## 2020-05-12 PROCEDURE — 99213 OFFICE O/P EST LOW 20 MIN: CPT | Performed by: FAMILY MEDICINE

## 2020-05-12 RX ORDER — AZELASTINE 1 MG/ML
1 SPRAY, METERED NASAL 2 TIMES DAILY
Qty: 2 BOTTLE | Refills: 1 | Status: ON HOLD | OUTPATIENT
Start: 2020-05-12 | End: 2020-10-09 | Stop reason: HOSPADM

## 2020-05-12 NOTE — TELEPHONE ENCOUNTER
[facility] called to inform office that they decline option for Virtual Visit. Patient declined MyChart. Please call patient with any changes/questions/concerns. *Patient declined a VV and only wanted an in office appt so Dr Tato Gerber could look in his ears.

## 2020-05-13 ASSESSMENT — ENCOUNTER SYMPTOMS
NAUSEA: 0
VOMITING: 0
CONSTIPATION: 0
COLOR CHANGE: 0
SINUS PRESSURE: 1
COUGH: 0
RHINORRHEA: 0
ABDOMINAL PAIN: 0
DIARRHEA: 0

## 2020-05-13 NOTE — PROGRESS NOTES
SUBJECTIVE:    Patient ID: Markos Odell is a 44 y.o. male. HPI:   Patient presents today for complaints of chronic sinusitis. He states that this is been going on for several years. He states that he has pressure in his maxillary and frontal area. He states that he also has pain down in his teeth. He states that he is not running fever and is not had chills. He denies any shortness of breath or cough. He has been using sinus relief over-the-counter as well as an Afrin nose spray occasionally. He has tried using Flonase consistently for about 40 days without any improvement in his symptoms. He also has been taking loratadine regularly without any improvement in his symptoms. He has never seen ENT. He did have a CT scan done of his head which showed some chronic sinusitis of the ethmoid and maxillary sinuses. Past Medical History:   Diagnosis Date    Chronic back pain     Compression of sciatic nerve     Hypogonadism male       Current Outpatient Medications   Medication Sig Dispense Refill    azelastine (ASTELIN) 0.1 % nasal spray 1 spray by Nasal route 2 times daily Use in each nostril as directed 2 Bottle 1    meloxicam (MOBIC) 15 MG tablet Take 15 mg by mouth daily      DULoxetine (CYMBALTA) 30 MG extended release capsule Take 30 mg by mouth daily       No current facility-administered medications for this visit. No Known Allergies    Review of Systems   Constitutional: Negative for activity change, appetite change and fatigue. HENT: Positive for sinus pressure. Negative for congestion and rhinorrhea. Eyes: Negative for visual disturbance. Respiratory: Negative for cough. Cardiovascular: Negative for chest pain and palpitations. Gastrointestinal: Negative for abdominal pain, constipation, diarrhea, nausea and vomiting. Genitourinary: Negative for decreased urine volume and difficulty urinating. Musculoskeletal: Negative for arthralgias.    Skin: Negative for

## 2020-06-11 ENCOUNTER — OFFICE VISIT (OUTPATIENT)
Dept: OTOLARYNGOLOGY | Age: 50
End: 2020-06-11
Payer: COMMERCIAL

## 2020-06-11 VITALS
DIASTOLIC BLOOD PRESSURE: 80 MMHG | BODY MASS INDEX: 24.95 KG/M2 | WEIGHT: 184 LBS | SYSTOLIC BLOOD PRESSURE: 128 MMHG | RESPIRATION RATE: 18 BRPM | HEART RATE: 79 BPM | TEMPERATURE: 97.8 F | OXYGEN SATURATION: 98 %

## 2020-06-11 PROBLEM — J32.8 OTHER CHRONIC SINUSITIS: Status: ACTIVE | Noted: 2020-06-11

## 2020-06-11 PROCEDURE — 99243 OFF/OP CNSLTJ NEW/EST LOW 30: CPT | Performed by: OTOLARYNGOLOGY

## 2020-06-11 RX ORDER — FLUTICASONE PROPIONATE 50 MCG
2 SPRAY, SUSPENSION (ML) NASAL DAILY
Qty: 1 BOTTLE | Refills: 5 | Status: ON HOLD | OUTPATIENT
Start: 2020-06-11 | End: 2020-10-09 | Stop reason: HOSPADM

## 2020-06-11 RX ORDER — PREDNISONE 10 MG/1
TABLET ORAL
Qty: 34 TABLET | Refills: 0 | Status: SHIPPED | OUTPATIENT
Start: 2020-06-11 | End: 2020-08-03

## 2020-06-11 RX ORDER — AMOXICILLIN AND CLAVULANATE POTASSIUM 875; 125 MG/1; MG/1
1 TABLET, FILM COATED ORAL 2 TIMES DAILY
Qty: 28 TABLET | Refills: 1 | Status: SHIPPED | OUTPATIENT
Start: 2020-06-11 | End: 2020-07-09

## 2020-06-11 NOTE — PROGRESS NOTES
irrigation, oral steroids and antibiotics and reevaluate with dedicated sinus CT scan in a few weeks         Relevant Medications    amoxicillin-clavulanate (AUGMENTIN) 875-125 MG per tablet    predniSONE (DELTASONE) 10 MG tablet    fluticasone (FLONASE) 50 MCG/ACT nasal spray    Other Relevant Orders    CT Sinus WO Contrast          Orders Placed This Encounter   Procedures    CT Sinus WO Contrast     Standing Status:   Future     Standing Expiration Date:   2020     Order Specific Question:   Reason for exam:     Answer:   Posttreatment study for chronic sinusitis       Orders Placed This Encounter   Medications    amoxicillin-clavulanate (AUGMENTIN) 875-125 MG per tablet     Sig: Take 1 tablet by mouth 2 times daily for 28 days Take with meals     Dispense:  28 tablet     Refill:  1    predniSONE (DELTASONE) 10 MG tablet     Si tablets twice daily for 6 days then 2 tablets once daily for 4 days then 1 tablet once daily for 2 days     Dispense:  34 tablet     Refill:  0    fluticasone (FLONASE) 50 MCG/ACT nasal spray     Si sprays by Nasal route daily Use right hand to spray into left nostril and use left hand to spray into right nostril  Do not swallow after spraying. Simply wipe away any excess. Dispense:  1 Bottle     Refill:  5             Please note that this chart was generated using dragon dictation software. Although every effort was made to ensure the accuracy of this automated transcription, some errors in transcription may have occurred.

## 2020-06-11 NOTE — ASSESSMENT & PLAN NOTE
Prior imaging studies show incomplete views of the sinuses. In looking at CT and MRI studies done in the past year it looks as if he has inflammation at the floor of both maxillary sinuses along with scattered ethmoid inflammation bilaterally.   We will treat with Flonase, nasal irrigation, oral steroids and antibiotics and reevaluate with dedicated sinus CT scan in a few weeks

## 2020-07-16 ENCOUNTER — HOSPITAL ENCOUNTER (OUTPATIENT)
Dept: CT IMAGING | Age: 50
Discharge: HOME | End: 2020-07-16
Payer: COMMERCIAL

## 2020-07-16 PROCEDURE — 70486 CT MAXILLOFACIAL W/O DYE: CPT

## 2020-07-23 ENCOUNTER — TELEPHONE (OUTPATIENT)
Dept: OTOLARYNGOLOGY | Age: 50
End: 2020-07-23

## 2020-07-23 ENCOUNTER — OFFICE VISIT (OUTPATIENT)
Dept: OTOLARYNGOLOGY | Age: 50
End: 2020-07-23
Payer: COMMERCIAL

## 2020-07-23 VITALS
SYSTOLIC BLOOD PRESSURE: 136 MMHG | WEIGHT: 187 LBS | DIASTOLIC BLOOD PRESSURE: 86 MMHG | BODY MASS INDEX: 25.33 KG/M2 | HEIGHT: 72 IN

## 2020-07-23 PROBLEM — J34.3 NASAL TURBINATE HYPERTROPHY: Status: ACTIVE | Noted: 2020-07-23

## 2020-07-23 PROBLEM — J34.2 DEVIATED NASAL SEPTUM: Status: ACTIVE | Noted: 2020-07-23

## 2020-07-23 PROCEDURE — 99212 OFFICE O/P EST SF 10 MIN: CPT | Performed by: OTOLARYNGOLOGY

## 2020-07-23 NOTE — ASSESSMENT & PLAN NOTE
Turbinate congestion and hypertrophy with resulting symptoms of nasal obstruction obvious on exam and CT scan.   Patient is a good candidate for turbinate reduction procedure

## 2020-07-23 NOTE — TELEPHONE ENCOUNTER
Follow-up disposition: Return if symptoms worsen or fail to improve.    Check out comments: Patient is attempting to attain clearance from the [place] for surgery     [facility]

## 2020-07-23 NOTE — PROGRESS NOTES
44 y.o.  male presents today to review recent CT study. He completed course of medication as directed which included antibiotics oral steroids nasal lavage and Flonase. Symptomatically he is improved. He especially notes benefit with the Flonase. However he continues to complain of facial pain and headaches along with chronic right sided nasal obstruction. History reviewed. No pertinent family history. Social History     Socioeconomic History    Marital status:      Spouse name: None    Number of children: None    Years of education: None    Highest education level: None   Occupational History    None   Social Needs    Financial resource strain: None    Food insecurity     Worry: None     Inability: None    Transportation needs     Medical: None     Non-medical: None   Tobacco Use    Smoking status: Current Every Day Smoker     Packs/day: 1.00     Types: Cigarettes    Smokeless tobacco: Never Used   Substance and Sexual Activity    Alcohol use:  Yes    Drug use: No    Sexual activity: Yes     Partners: Female   Lifestyle    Physical activity     Days per week: None     Minutes per session: None    Stress: None   Relationships    Social connections     Talks on phone: None     Gets together: None     Attends Episcopalian service: None     Active member of club or organization: None     Attends meetings of clubs or organizations: None     Relationship status: None    Intimate partner violence     Fear of current or ex partner: None     Emotionally abused: None     Physically abused: None     Forced sexual activity: None   Other Topics Concern    None   Social History Narrative    None     Past Medical History:   Diagnosis Date    Chronic back pain     Compression of sciatic nerve     Hypogonadism male     Sleep apnea     no c-pap     Past Surgical History:   Procedure Laterality Date    WRIST SURGERY           REVIEW OF SYSTEMS:  all other systems reviewed and are negative  General Health: no change in health status since last visit  Neurologic: headache: Yes  Nose: sinus pain: Yes, sinus pressure: Yes, congestion: Yes and obstruction: Yes       Comments:     PHYSICAL EXAM:    /86   Ht 6' (1.829 m)   Wt 187 lb (84.8 kg)   BMI 25.36 kg/m²   Body mass index is 25.36 kg/m². General Appearance: well developed , well nourished and no distress  Head/ Face: normocephalic and atraumatic  Vocal Quality: good/ normal  Nose: septum deviated Yes and  Right, mucosa congested, turbinates: engorged / congested and polyps No  Neuro: alert and oriented x3 and cranial nerves II- XII grossly intact  Psych/ Mood: cooperative and no depression, anxiety or agitation    Assessment & Plan:    Problem List Items Addressed This Visit     Other chronic sinusitis     CT scan shows bilateral scattered ethmoid mucoperiosteal thickening along with bilateral maxillary inflammation and likely polypoid/cystic changes. There is also very minor mucoperiosteal thickening at the left sphenoid. The patient has completed a course of antibiotics and oral steroids as directed. Symptomatically he is improved and feels the Flonase is helping him a great deal.  However he continues to be troubled with headaches and facial pain and given the CT findings I feel is a candidate for endoscopic sinus surgery         Deviated nasal septum     Obvious septal deviation to right on exam.  Supported by CT findings of rightward septal deviation with posterior spur to right. Patient complains of chronic right-sided nasal obstruction. Certainly this would be amenable to surgical correction         Nasal turbinate hypertrophy     Turbinate congestion and hypertrophy with resulting symptoms of nasal obstruction obvious on exam and CT scan. Patient is a good candidate for turbinate reduction procedure               No orders of the defined types were placed in this encounter.       No orders of the defined types were placed in this

## 2020-07-23 NOTE — ASSESSMENT & PLAN NOTE
Obvious septal deviation to right on exam.  Supported by CT findings of rightward septal deviation with posterior spur to right. Patient complains of chronic right-sided nasal obstruction.   Certainly this would be amenable to surgical correction

## 2020-07-23 NOTE — ASSESSMENT & PLAN NOTE
CT scan shows bilateral scattered ethmoid mucoperiosteal thickening along with bilateral maxillary inflammation and likely polypoid/cystic changes. There is also very minor mucoperiosteal thickening at the left sphenoid. The patient has completed a course of antibiotics and oral steroids as directed.   Symptomatically he is improved and feels the Flonase is helping him a great deal.  However he continues to be troubled with headaches and facial pain and given the CT findings I feel is a candidate for endoscopic sinus surgery

## 2020-07-26 ENCOUNTER — APPOINTMENT (OUTPATIENT)
Dept: GENERAL RADIOLOGY | Age: 50
End: 2020-07-26
Payer: COMMERCIAL

## 2020-07-26 ENCOUNTER — HOSPITAL ENCOUNTER (EMERGENCY)
Age: 50
Discharge: HOME | End: 2020-07-26
Attending: EMERGENCY MEDICINE
Payer: COMMERCIAL

## 2020-07-26 VITALS
DIASTOLIC BLOOD PRESSURE: 89 MMHG | HEART RATE: 94 BPM | SYSTOLIC BLOOD PRESSURE: 158 MMHG | BODY MASS INDEX: 25.09 KG/M2 | RESPIRATION RATE: 20 BRPM | OXYGEN SATURATION: 99 % | WEIGHT: 185 LBS | TEMPERATURE: 98.7 F

## 2020-07-26 PROCEDURE — 6370000000 HC RX 637 (ALT 250 FOR IP): Performed by: EMERGENCY MEDICINE

## 2020-07-26 PROCEDURE — 71101 X-RAY EXAM UNILAT RIBS/CHEST: CPT

## 2020-07-26 PROCEDURE — 99283 EMERGENCY DEPT VISIT LOW MDM: CPT

## 2020-07-26 PROCEDURE — 6360000002 HC RX W HCPCS: Performed by: EMERGENCY MEDICINE

## 2020-07-26 PROCEDURE — 99999 PR OFFICE/OUTPT VISIT,PROCEDURE ONLY: CPT | Performed by: EMERGENCY MEDICINE

## 2020-07-26 PROCEDURE — 96372 THER/PROPH/DIAG INJ SC/IM: CPT

## 2020-07-26 RX ORDER — KETOROLAC TROMETHAMINE 30 MG/ML
30 INJECTION, SOLUTION INTRAMUSCULAR; INTRAVENOUS ONCE
Status: COMPLETED | OUTPATIENT
Start: 2020-07-26 | End: 2020-07-26

## 2020-07-26 RX ORDER — LIDOCAINE 4 G/G
1 PATCH TOPICAL DAILY
Qty: 30 PATCH | Refills: 0 | Status: SHIPPED | OUTPATIENT
Start: 2020-07-26 | End: 2020-08-25

## 2020-07-26 RX ORDER — CYCLOBENZAPRINE HCL 10 MG
10 TABLET ORAL ONCE
Status: COMPLETED | OUTPATIENT
Start: 2020-07-26 | End: 2020-07-26

## 2020-07-26 RX ORDER — TIZANIDINE 4 MG/1
4 TABLET ORAL EVERY 8 HOURS PRN
Qty: 15 TABLET | Refills: 0 | Status: SHIPPED | OUTPATIENT
Start: 2020-07-26 | End: 2020-07-31

## 2020-07-26 RX ADMIN — KETOROLAC TROMETHAMINE 30 MG: 30 INJECTION, SOLUTION INTRAMUSCULAR at 12:41

## 2020-07-26 RX ADMIN — CYCLOBENZAPRINE HYDROCHLORIDE 10 MG: 10 TABLET, FILM COATED ORAL at 12:42

## 2020-07-26 ASSESSMENT — PAIN SCALES - GENERAL
PAINLEVEL_OUTOF10: 8
PAINLEVEL_OUTOF10: 8

## 2020-07-26 ASSESSMENT — ENCOUNTER SYMPTOMS
COUGH: 0
EYE PAIN: 0
ABDOMINAL PAIN: 0
RHINORRHEA: 0
DIARRHEA: 0
VOMITING: 0
SHORTNESS OF BREATH: 0
EYE REDNESS: 0
VOICE CHANGE: 0

## 2020-07-26 NOTE — ED PROVIDER NOTES
[facility] EMERGENCY DEPT  EMERGENCY DEPARTMENT ENCOUNTER      Pt Name: Ciaran Becerra  MRN: [id number]  [place] 1970  Date of evaluation: 7/26/2020  Provider: Hema Dodge MD    [address] [place] [address]       Chief Complaint   Patient presents with    Rib Pain     L sided         HISTORY OF PRESENT ILLNESS   (Location/Symptom, Timing/Onset,Context/Setting, Quality, Duration, Modifying Factors, Severity)  Note limiting factors. Ciaran Becerra is a 44 y.o. male who presents to the emergency department with complaint of left-sided rib pain over the last 3 days. States pain started suddenly when he lifted his English bulldog to put him in the pool, causing him to lean against the side of the pool. Denies any shortness of breath but states it hurts worse to take a deep breath in.  Pain is focal to the left lower ribs. HPI    NursingNotes were reviewed. REVIEW OF SYSTEMS    (2-9 systems for level 4, 10 or more for level 5)     Review of Systems   Constitutional: Negative for fatigue and fever. HENT: Negative for congestion, rhinorrhea and voice change. Eyes: Negative for pain and redness. Respiratory: Negative for cough and shortness of breath. Cardiovascular: Positive for chest pain. Gastrointestinal: Negative for abdominal pain, diarrhea and vomiting. Endocrine: Negative. Genitourinary: Negative. Musculoskeletal: Negative for arthralgias and gait problem. Skin: Negative for rash and wound. Neurological: Negative for weakness and headaches. Hematological: Negative. Psychiatric/Behavioral: Negative. All other systems reviewed and are negative. A complete review of systems was performed and is negative except as noted above in the HPI.        PAST MEDICAL HISTORY     Past Medical History:   Diagnosis Date    Chronic back pain     Compression of sciatic nerve     Hypogonadism male     Sleep apnea     no c-pap         SURGICAL HISTORY       Past Surgical History: Procedure Laterality Date    WRIST SURGERY           CURRENT MEDICATIONS       Previous Medications    AZELASTINE (ASTELIN) 0.1 % NASAL SPRAY    1 spray by Nasal route 2 times daily Use in each nostril as directed    DULOXETINE (CYMBALTA) 30 MG EXTENDED RELEASE CAPSULE    Take 30 mg by mouth daily    FLUTICASONE (FLONASE) 50 MCG/ACT NASAL SPRAY    2 sprays by Nasal route daily Use right hand to spray into left nostril and use left hand to spray into right nostril  Do not swallow after spraying. Simply wipe away any excess. MELOXICAM (MOBIC) 15 MG TABLET    Take 15 mg by mouth daily    PREDNISONE (DELTASONE) 10 MG TABLET    2 tablets twice daily for 6 days then 2 tablets once daily for 4 days then 1 tablet once daily for 2 days       ALLERGIES     Patient has no known allergies. FAMILY HISTORY     History reviewed. No pertinent family history. SOCIAL HISTORY       Social History     Socioeconomic History    Marital status:      Spouse name: None    Number of children: None    Years of education: None    Highest education level: None   Occupational History    None   Social Needs    Financial resource strain: None    Food insecurity     Worry: None     Inability: None    Transportation needs     Medical: None     Non-medical: None   Tobacco Use    Smoking status: Current Every Day Smoker     Packs/day: 1.00     Types: Cigarettes    Smokeless tobacco: Never Used   Substance and Sexual Activity    Alcohol use:  Yes    Drug use: No    Sexual activity: Yes     Partners: Female   Lifestyle    Physical activity     Days per week: None     Minutes per session: None    Stress: None   Relationships    Social connections     Talks on phone: None     Gets together: None     Attends Orthodox service: None     Active member of club or organization: None     Attends meetings of clubs or organizations: None     Relationship status: None    Intimate partner violence     Fear of current or ex partner: None     Emotionally abused: None     Physically abused: None     Forced sexual activity: None   Other Topics Concern    None   Social History Narrative    None       SCREENINGS    Raul Coma Scale  Eye Opening: Spontaneous  Best Verbal Response: Oriented  Best Motor Response: Obeys commands  Raul Coma Scale Score: 15        PHYSICAL EXAM    (up to 7 for level 4, 8 or more for level 5)     ED Triage Vitals [07/26/20 1213]   BP Temp Temp Source Pulse Resp SpO2 Height Weight   (!) 158/89 98.7 °F (37.1 °C) Temporal 94 20 99 % -- 185 lb (83.9 kg)       Physical Exam  Vitals signs and nursing note reviewed. Constitutional:       General: He is not in acute distress. Appearance: He is well-developed. He is not toxic-appearing or diaphoretic. HENT:      Head: Normocephalic and atraumatic. Eyes:      General: No scleral icterus. Right eye: No discharge. Left eye: No discharge. Pupils: Pupils are equal, round, and reactive to light. Neck:      Musculoskeletal: Normal range of motion. Cardiovascular:      Rate and Rhythm: Normal rate and regular rhythm. Heart sounds: Normal heart sounds. Pulmonary:      Effort: Pulmonary effort is normal. No respiratory distress. Breath sounds: Normal breath sounds. No stridor. No wheezing, rhonchi or rales. Chest:      Chest wall: Tenderness present. Comments: Focal area of tenderness to the lateral lower left rib without deformity, step-off, or other external signs of injury  Abdominal:      General: There is no distension. Musculoskeletal: Normal range of motion. General: No deformity. Skin:     General: Skin is warm and dry. Neurological:      Mental Status: He is alert and oriented to person, place, and time. GCS: GCS eye subscore is 4. GCS verbal subscore is 5. GCS motor subscore is 6. Cranial Nerves: No cranial nerve deficit. Motor: No abnormal muscle tone.    Psychiatric: plan.    CONSULTS:  None    PROCEDURES:  Unless otherwise notedbelow, none     Procedures      FINAL IMPRESSION     1.  Rib contusion, left, initial encounter          DISPOSITION/PLAN   DISPOSITION Discharge - Pending Orders Complete 07/26/2020 01:01:16 PM      PATIENT REFERRED TO:  [facility] EMERGENCY DEPT  Ramiro Lloyd  [phone]    If symptoms worsen    Hamlet Romero MD  [address] [address]  [phone]      As needed      DISCHARGE MEDICATIONS:  New Prescriptions    DICLOFENAC SODIUM (VOLTAREN) 1 % GEL    Apply 2 g topically 4 times daily for 10 days    LIDOCAINE 4 % EXTERNAL PATCH    Place 1 patch onto the skin daily    TIZANIDINE (ZANAFLEX) 4 MG TABLET    Take 1 tablet by mouth every 8 hours as needed (muscle spasm/pain)          (Please note that portions of this note were completed with a voice recognition program.  Efforts were made to edit the dictations butoccasionally words are mis-transcribed.)    Devin Hylton MD (electronically signed)  AttendingEmerJeff[facility] Center Physician    e     Devin Wells MD  07/26/20 0495

## 2020-08-03 ENCOUNTER — OFFICE VISIT (OUTPATIENT)
Dept: PRIMARY CARE CLINIC | Age: 50
End: 2020-08-03
Payer: COMMERCIAL

## 2020-08-03 VITALS
TEMPERATURE: 97.7 F | DIASTOLIC BLOOD PRESSURE: 80 MMHG | HEIGHT: 72 IN | OXYGEN SATURATION: 97 % | BODY MASS INDEX: 25.6 KG/M2 | RESPIRATION RATE: 16 BRPM | WEIGHT: 189 LBS | SYSTOLIC BLOOD PRESSURE: 110 MMHG | HEART RATE: 78 BPM

## 2020-08-03 PROCEDURE — 99213 OFFICE O/P EST LOW 20 MIN: CPT | Performed by: NURSE PRACTITIONER

## 2020-08-03 PROCEDURE — 1111F DSCHRG MED/CURRENT MED MERGE: CPT | Performed by: NURSE PRACTITIONER

## 2020-08-03 ASSESSMENT — ENCOUNTER SYMPTOMS: SHORTNESS OF BREATH: 0

## 2020-08-03 NOTE — PROGRESS NOTES
[address] [place] [address]  [address]  [address] [address]  Dept: [phone]  Dept Fax: [phone]  Loc: [phone]    Tobin Lee is a 44 y.o. male who presents today for his medical conditions/complaints as noted below. Tobin Lee is c/o of Follow-Up from Hospital (patient presents today for hospital follow up from [facility]. Patient states that he had picked his dog up and put him in the pull and injured himself. He is needing a note for work. )        HPI:     HPI   Chief Complaint   Patient presents with    Follow-Up from Hospital     patient presents today for hospital follow up from [facility]. Patient states that he had picked his dog up and put him in the pull and injured himself. He is needing a note for work. He is still having some tenderness in those left ribs. He was told there was no fracture but it does hurt him when he takes a deep breath. Is not had any fever. He is not coughed up anything. Past Medical History:   Diagnosis Date    Chronic back pain     Compression of sciatic nerve     Hypogonadism male     Sleep apnea     no c-pap      Past Surgical History:   Procedure Laterality Date    WRIST SURGERY         Vitals 8/3/2020 7/26/2020 7/26/2020 7/26/2020 7/26/2020 9/54/9932   SYSTOLIC 352 - - - 175 238   DIASTOLIC 80 - - - 89 86   Site - - - - - -   Position - - - - - -   Cuff Size - - - - - -   Pulse 78 - 94 - 94 -   Temp 97.7 - - - 98.7 -   Resp 16 - - - 20 -   SpO2 97 - - - 99 -   Weight 189 lb - - - 185 lb 187 lb   Height 6' 0\" - - - - 6' 0\"   BMI (wt*703/ht~2) 25.63 kg/m2 - - - - 25.36 kg/m2   Pain Level - 8 - 8 - -   Some recent data might be hidden       History reviewed. No pertinent family history. Social History     Tobacco Use    Smoking status: Current Every Day Smoker     Packs/day: 1.00     Types: Cigarettes    Smokeless tobacco: Never Used   Substance Use Topics    Alcohol use:  Yes Current Outpatient Medications   Medication Sig Dispense Refill    lidocaine 4 % external patch Place 1 patch onto the skin daily 30 patch 0    fluticasone (FLONASE) 50 MCG/ACT nasal spray 2 sprays by Nasal route daily Use right hand to spray into left nostril and use left hand to spray into right nostril  Do not swallow after spraying. Simply wipe away any excess. 1 Bottle 5    azelastine (ASTELIN) 0.1 % nasal spray 1 spray by Nasal route 2 times daily Use in each nostril as directed 2 Bottle 1    meloxicam (MOBIC) 15 MG tablet Take 15 mg by mouth daily      DULoxetine (CYMBALTA) 30 MG extended release capsule Take 30 mg by mouth daily       No current facility-administered medications for this visit. No Known Allergies    Health Maintenance   Topic Date Due    Pneumococcal 0-64 years Vaccine (1 of 1 - PPSV23) 08/27/1974    HIV screen  08/27/1983    DTaP/Tdap/Td vaccine (1 - Tdap) 08/27/1987    Lipid screen  08/27/2008    Diabetes screen  08/27/2008    Shingles Vaccine (1 of 2) 08/27/2018    Colon cancer screen colonoscopy  08/27/2018    Flu vaccine (1) 09/01/2020    Hepatitis A vaccine  Aged Out    Hepatitis B vaccine  Aged Out    Hib vaccine  Aged Out    Meningococcal (ACWY) vaccine  Aged Out       Subjective:      Review of Systems   Constitutional: Negative for fatigue and fever. Respiratory: Negative for shortness of breath. Pain in left ribs       Objective:     Physical Exam  Vitals signs and nursing note reviewed. Constitutional:       Appearance: Normal appearance. He is well-developed and normal weight. HENT:      Head: Normocephalic. Cardiovascular:      Rate and Rhythm: Normal rate and regular rhythm. Heart sounds: Normal heart sounds. Pulmonary:      Effort: Pulmonary effort is normal.      Breath sounds: Normal breath sounds. Chest:       Skin:     General: Skin is warm and dry.    Neurological:      Mental Status: He is alert and oriented to person, place, and time. Psychiatric:         Behavior: Behavior normal.         Thought Content: Thought content normal.         Judgment: Judgment normal.       /80   Pulse 78   Temp 97.7 °F (36.5 °C) (Temporal)   Resp 16   Ht 6' (1.829 m)   Wt 189 lb (85.7 kg)   SpO2 97%   BMI 25.63 kg/m²     Assessment:       Diagnosis Orders   1. Rib contusion, left, sequela           Plan:   More than 50% of the time was spent counseling and coordinating care for a total time of 15 min face to face. Patient given educational materials -see patient instructions. Discussed use, benefit, and side effects of prescribed medications. All patient questions answered. Pt voiced understanding. Reviewed health maintenance. Instructed to continue currentmedications, diet and exercise. Patient agreed with treatment plan. Follow up as directed. MEDICATIONS:  No orders of the defined types were placed in this encounter. ORDERS:  No orders of the defined types were placed in this encounter. Follow-up:  No follow-ups on file. PATIENT INSTRUCTIONS:  Patient Instructions   Continue mobic, topical relief  Stay off work a week  If not better or fever CT chest with ribs. Electronically signed by RYLEE Harvey CNP on 8/3/2020 at 6:28 PM    EMR Dragon/transcription disclaimer:  Much of thisencounter note is electronic transcription/translation of spoken language to printed texts. The electronic translation of spoken language may be erroneous, or at times, nonsensical words or phrases may be inadvertentlytranscribed.   Although I have reviewed the note for such errors, some may still exist.

## 2020-09-21 ENCOUNTER — OFFICE VISIT (OUTPATIENT)
Dept: OTOLARYNGOLOGY | Age: 50
End: 2020-09-21
Payer: COMMERCIAL

## 2020-09-21 VITALS
BODY MASS INDEX: 24.92 KG/M2 | HEIGHT: 72 IN | SYSTOLIC BLOOD PRESSURE: 136 MMHG | DIASTOLIC BLOOD PRESSURE: 86 MMHG | WEIGHT: 184 LBS

## 2020-09-21 PROCEDURE — 99213 OFFICE O/P EST LOW 20 MIN: CPT | Performed by: OTOLARYNGOLOGY

## 2020-09-21 NOTE — PROGRESS NOTES
44 y.o.  male presents today with symptoms of chronic nasal obstruction and a history of chronic sinusitis. He returns today to discuss the possibility of surgery. He continues to complain of chronic headache facial pain and nasal obstruction which interferes with his sleep. He has been treated with aggressive therapy including oral steroids antibiotics etc. with no real longstanding benefit    History reviewed. No pertinent family history. Social History     Socioeconomic History    Marital status:      Spouse name: None    Number of children: None    Years of education: None    Highest education level: None   Occupational History    None   Social Needs    Financial resource strain: None    Food insecurity     Worry: None     Inability: None    Transportation needs     Medical: None     Non-medical: None   Tobacco Use    Smoking status: Current Every Day Smoker     Packs/day: 1.00     Types: Cigarettes    Smokeless tobacco: Never Used   Substance and Sexual Activity    Alcohol use:  Yes    Drug use: No    Sexual activity: Yes     Partners: Female   Lifestyle    Physical activity     Days per week: None     Minutes per session: None    Stress: None   Relationships    Social connections     Talks on phone: None     Gets together: None     Attends Sabianist service: None     Active member of club or organization: None     Attends meetings of clubs or organizations: None     Relationship status: None    Intimate partner violence     Fear of current or ex partner: None     Emotionally abused: None     Physically abused: None     Forced sexual activity: None   Other Topics Concern    None   Social History Narrative    None     Past Medical History:   Diagnosis Date    Chronic back pain     Compression of sciatic nerve     Hypogonadism male     Sleep apnea     no c-pap     Past Surgical History:   Procedure Laterality Date    WRIST SURGERY           REVIEW OF SYSTEMS:  all other systems reviewed and are negative  General Health: no change in health status since last visit and fevers: No  Sleep: restlessness: Yes nasal obstruction: Yes mouth breathing: Yes  Nose: sinus pain: Yes, sinus pressure: Yes, congestion: Yes and obstruction: Yes       Comments:     PHYSICAL EXAM:    /86   Ht 6' (1.829 m)   Wt 184 lb (83.5 kg)   BMI 24.95 kg/m²   Body mass index is 24.95 kg/m². General Appearance: well developed , well nourished and no distress  Head/ Face: normocephalic and atraumatic  Vocal Quality: good/ normal  Nose: septum deviated Yes and  Rightward deviation of septum with deformity of crest to left, turbinates: hypertrophic and polyps No  Neuro: alert and oriented x3 and cranial nerves II- XII grossly intact  Psych/ Mood: cooperative and no depression, anxiety or agitation    Assessment & Plan:    Problem List Items Addressed This Visit        ENT Problems    Other chronic sinusitis     Diffuse mucoperiosteal thickening involving mainly anterior and mid portions of ethmoid sinuses bilaterally. Bilateral maxillary sinus mucoperiosteal thickening. Mild mucoperiosteal thickening of right sphenoid. Both frontal sinuses essentially clear. Patient complains of headaches and chronic facial congestion. Recommend endoscopic sinus surgery for bilateral maxillary antrostomy with bilateral ethmoidectomy         Relevant Orders    AL REMOV ETHMOID SINUS,EXTRANASAL,TOTL    AL NASAL SCOPY,OPEN MAXILL SINUS    Deviated nasal septum     Septal deformity to right with resulting obstruction. Patient complains of chronic nasal obstruction and sleep difficulties  Amenable to surgical correction         Relevant Orders    AL REPAIR OF NASAL SEPTUM    Nasal turbinate hypertrophy     Congested inferior turbinates contributing to nasal obstruction.   Good candidate for turbinate reduction procedure         Relevant Orders    AL CAUTER TURBINATE MUCOSA,INTRAMURAL    AL THERAPEUTIC FRACTURE INFER TURBINATE Orders Placed This Encounter   Procedures    IN REPAIR OF NASAL SEPTUM     Nature of surgery along with risks and benefits discussed with patient. He consented to surgery as discussed. Standing Status:   Future     Standing Expiration Date:   10/21/2020    IN CAUTER TURBINATE MUCOSA,INTRAMURAL     Nature of sertraline with risks and benefits discussed with patient. He consented to surgery as discussed     Standing Status:   Future     Standing Expiration Date:   10/21/2020    IN THERAPEUTIC FRACTURE INFER Ofilia Irons of surgery along with risks and benefits discussed with patient. He consented to surgery as discussed     Standing Status:   Future     Standing Expiration Date:   10/21/2020    IN REMOV ETHMOID Marvine Dunker of surgery along with risks and benefits discussed with patient. He consented to surgery as discussed     Standing Status:   Future     Standing Expiration Date:   10/21/2020    IN NASAL [address] of surgery along with risks and benefits discussed with patient. He consented to surgery as proposed     Standing Status:   Future     Standing Expiration Date:   10/21/2020       No orders of the defined types were placed in this encounter. Please note that this chart was generated using dragon dictation software. Although every effort was made to ensure the accuracy of this automated transcription, some errors in transcription may have occurred.

## 2020-09-21 NOTE — ASSESSMENT & PLAN NOTE
Congested inferior turbinates contributing to nasal obstruction.   Good candidate for turbinate reduction procedure

## 2020-09-21 NOTE — ASSESSMENT & PLAN NOTE
Diffuse mucoperiosteal thickening involving mainly anterior and mid portions of ethmoid sinuses bilaterally. Bilateral maxillary sinus mucoperiosteal thickening. Mild mucoperiosteal thickening of right sphenoid. Both frontal sinuses essentially clear. Patient complains of headaches and chronic facial congestion.   Recommend endoscopic sinus surgery for bilateral maxillary antrostomy with bilateral ethmoidectomy

## 2020-09-21 NOTE — ASSESSMENT & PLAN NOTE
Septal deformity to right with resulting obstruction.   Patient complains of chronic nasal obstruction and sleep difficulties  Amenable to surgical correction

## 2020-09-23 ENCOUNTER — TELEPHONE (OUTPATIENT)
Dept: OTOLARYNGOLOGY | Age: 50
End: 2020-09-23

## 2020-09-23 NOTE — TELEPHONE ENCOUNTER
I made the patient an appointment at his PCP for surgical clearance. Im unsure if he needs to be cleared by them or the [place]. I tried to contact the patient to discuss but I had to leave a VM. I told patient to call me back to discuss.

## 2020-09-23 NOTE — TELEPHONE ENCOUNTER
----- Message from Vicente Alejandro MD sent at 9/21/2020  1:39 PM CDT -----  Mr. Garrison Burkitt wishes to undergo nasal surgery. I would asked that you provide a letter verifying that he was recently examined by you and that he is medically cleared to undergo general anesthesia.   Surgery is tentatively scheduled for October 7

## 2020-09-25 NOTE — TELEPHONE ENCOUNTER
I spoke with the patient whom stated [address] [place] [address] Drive office was fine and he has an appointment next week. I told patient once he has his clearance completed he will be all set for surgery. Patient voiced agreement.

## 2020-09-30 ENCOUNTER — ANESTHESIA EVENT (OUTPATIENT)
Dept: OPERATING ROOM | Age: 50
End: 2020-09-30

## 2020-09-30 ENCOUNTER — OFFICE VISIT (OUTPATIENT)
Dept: PRIMARY CARE CLINIC | Age: 50
End: 2020-09-30
Payer: COMMERCIAL

## 2020-09-30 VITALS
HEART RATE: 80 BPM | OXYGEN SATURATION: 96 % | BODY MASS INDEX: 25.73 KG/M2 | RESPIRATION RATE: 16 BRPM | HEIGHT: 72 IN | SYSTOLIC BLOOD PRESSURE: 136 MMHG | WEIGHT: 190 LBS | TEMPERATURE: 98.5 F | DIASTOLIC BLOOD PRESSURE: 88 MMHG

## 2020-09-30 LAB
ALBUMIN SERPL-MCNC: 4.3 G/DL (ref 3.5–5.2)
ALP BLD-CCNC: 65 U/L (ref 40–130)
ALT SERPL-CCNC: 20 U/L (ref 5–41)
ANION GAP SERPL CALCULATED.3IONS-SCNC: 13 MMOL/L (ref 7–19)
AST SERPL-CCNC: 18 U/L (ref 5–40)
BASOPHILS ABSOLUTE: 0.1 K/UL (ref 0–0.2)
BASOPHILS RELATIVE PERCENT: 0.6 % (ref 0–1)
BILIRUB SERPL-MCNC: <0.2 MG/DL (ref 0.2–1.2)
BUN BLDV-MCNC: 13 MG/DL (ref 6–20)
CALCIUM SERPL-MCNC: 9 MG/DL (ref 8.6–10)
CHLORIDE BLD-SCNC: 102 MMOL/L (ref 98–111)
CO2: 21 MMOL/L (ref 22–29)
CREAT SERPL-MCNC: 0.9 MG/DL (ref 0.5–1.2)
EOSINOPHILS ABSOLUTE: 0.7 K/UL (ref 0–0.6)
EOSINOPHILS RELATIVE PERCENT: 6.3 % (ref 0–5)
GFR AFRICAN AMERICAN: >59
GFR NON-AFRICAN AMERICAN: >60
GLUCOSE BLD-MCNC: 86 MG/DL (ref 74–109)
HCT VFR BLD CALC: 53.4 % (ref 42–52)
HEMOGLOBIN: 18.7 G/DL (ref 14–18)
IMMATURE GRANULOCYTES #: 0.1 K/UL
LYMPHOCYTES ABSOLUTE: 3.1 K/UL (ref 1.1–4.5)
LYMPHOCYTES RELATIVE PERCENT: 29.5 % (ref 20–40)
MCH RBC QN AUTO: 34.1 PG (ref 27–31)
MCHC RBC AUTO-ENTMCNC: 35 G/DL (ref 33–37)
MCV RBC AUTO: 97.3 FL (ref 80–94)
MONOCYTES ABSOLUTE: 1.1 K/UL (ref 0–0.9)
MONOCYTES RELATIVE PERCENT: 10.9 % (ref 0–10)
NEUTROPHILS ABSOLUTE: 5.4 K/UL (ref 1.5–7.5)
NEUTROPHILS RELATIVE PERCENT: 52.1 % (ref 50–65)
PDW BLD-RTO: 12.3 % (ref 11.5–14.5)
PLATELET # BLD: 209 K/UL (ref 130–400)
PMV BLD AUTO: 10.5 FL (ref 9.4–12.4)
POTASSIUM SERPL-SCNC: 4.6 MMOL/L (ref 3.5–5)
RBC # BLD: 5.49 M/UL (ref 4.7–6.1)
SODIUM BLD-SCNC: 136 MMOL/L (ref 136–145)
TOTAL PROTEIN: 6.7 G/DL (ref 6.6–8.7)
WBC # BLD: 10.4 K/UL (ref 4.8–10.8)

## 2020-09-30 PROCEDURE — 99213 OFFICE O/P EST LOW 20 MIN: CPT | Performed by: FAMILY MEDICINE

## 2020-09-30 PROCEDURE — 93000 ELECTROCARDIOGRAM COMPLETE: CPT | Performed by: FAMILY MEDICINE

## 2020-09-30 RX ORDER — IBUPROFEN 200 MG
200 TABLET ORAL EVERY 6 HOURS PRN
Status: ON HOLD | COMMUNITY
End: 2020-10-09 | Stop reason: HOSPADM

## 2020-09-30 ASSESSMENT — ENCOUNTER SYMPTOMS
COUGH: 0
COLOR CHANGE: 0
NAUSEA: 0
DIARRHEA: 0
VOMITING: 0
CONSTIPATION: 0
RHINORRHEA: 0
ABDOMINAL PAIN: 0

## 2020-09-30 NOTE — PROGRESS NOTES
SUBJECTIVE:    Patient ID: Chary Parent is a 44 y.o. male. HPI:   Patient is here today for preop exam.  He states that he is going to be having a deviated septum repair done by Dr. arias. He states that this scheduled for next week. He denies any abnormal bleeding. He states that he has never had problems with anesthesia in the past.  He denies any family history of bleeding disorders. He denies any chest pain, shortness of breath, or palpitations. Past Medical History:   Diagnosis Date    Chronic back pain     Compression of sciatic nerve     Deviated septum     Hypogonadism male     Sleep apnea     no c-pap      Current Outpatient Medications   Medication Sig Dispense Refill    ibuprofen (ADVIL;MOTRIN) 200 MG tablet Take 200 mg by mouth every 6 hours as needed for Pain      fluticasone (FLONASE) 50 MCG/ACT nasal spray 2 sprays by Nasal route daily Use right hand to spray into left nostril and use left hand to spray into right nostril  Do not swallow after spraying. Simply wipe away any excess. 1 Bottle 5    azelastine (ASTELIN) 0.1 % nasal spray 1 spray by Nasal route 2 times daily Use in each nostril as directed 2 Bottle 1    meloxicam (MOBIC) 15 MG tablet Take 15 mg by mouth daily      DULoxetine (CYMBALTA) 30 MG extended release capsule Take 30 mg by mouth daily       No current facility-administered medications for this visit. No Known Allergies    Review of Systems   Constitutional: Negative for activity change, appetite change and fatigue. HENT: Negative for congestion and rhinorrhea. Eyes: Negative for visual disturbance. Respiratory: Negative for cough. Cardiovascular: Negative for chest pain and palpitations. Gastrointestinal: Negative for abdominal pain, constipation, diarrhea, nausea and vomiting. Genitourinary: Negative for decreased urine volume and difficulty urinating. Musculoskeletal: Negative for arthralgias.    Skin: Negative for color change and rash. Allergic/Immunologic: Negative for immunocompromised state. Neurological: Negative for seizures and headaches. Hematological: Does not bruise/bleed easily. Psychiatric/Behavioral: Negative for agitation and sleep disturbance. OBJECTIVE:     Physical Exam  Constitutional:       General: He is not in acute distress. Appearance: He is well-developed. He is not diaphoretic. HENT:      Head: Normocephalic and atraumatic. Neck:      Musculoskeletal: Normal range of motion and neck supple. Thyroid: No thyromegaly. Cardiovascular:      Rate and Rhythm: Normal rate and regular rhythm. Heart sounds: Normal heart sounds. Pulmonary:      Effort: Pulmonary effort is normal. No respiratory distress. Breath sounds: Normal breath sounds. No wheezing. Abdominal:      General: Bowel sounds are normal.      Palpations: Abdomen is soft. Tenderness: There is no abdominal tenderness. Lymphadenopathy:      Cervical: No cervical adenopathy. Skin:     General: Skin is warm and dry. Findings: No rash. Neurological:      Mental Status: He is alert and oriented to person, place, and time. Psychiatric:         Behavior: Behavior normal.         Thought Content: Thought content normal.         Judgment: Judgment normal.        /88 (Site: Left Upper Arm, Position: Sitting, Cuff Size: Medium Adult)   Pulse 80   Temp 98.5 °F (36.9 °C) (Temporal)   Resp 16   Ht 6' (1.829 m)   Wt 190 lb (86.2 kg)   SpO2 96%   BMI 25.77 kg/m²      ASSESSMENT:    Jillian Stockton was seen today for other.     Diagnoses and all orders for this visit:    Pre-op exam  -     EKG 12 Lead  -     CBC Auto Differential  -     Comprehensive Metabolic Panel  -     XR CHEST STANDARD (2 VW)    Chronic sinusitis, unspecified location  -     CBC Auto Differential  -     Comprehensive Metabolic Panel    Chronic maxillary sinusitis  -     CBC Auto Differential  -     Comprehensive Metabolic Panel        PLAN:    See lab orders. Will notify results. EKG is normal today. I also got a chest x-ray which was normal.  I feel like at this point he is medically maximized for surgery. Call us if any problems develop otherwise we will see him back for yearly physical.    EMR Dragon/transcription disclaimer:  Much of this encounter note is electronic transcription/translation of spoken language toprinted texts. The electronic translation of spoken language may be erroneous, or at times, nonsensical words or phrases may be inadvertently transcribed.   Although I have reviewed the note for such errors, some may stillexist.

## 2020-10-03 ENCOUNTER — OFFICE VISIT (OUTPATIENT)
Age: 50
End: 2020-10-03

## 2020-10-03 VITALS — TEMPERATURE: 97.8 F | HEART RATE: 93 BPM | OXYGEN SATURATION: 93 %

## 2020-10-07 ENCOUNTER — ANESTHESIA (OUTPATIENT)
Dept: OPERATING ROOM | Age: 50
End: 2020-10-07

## 2020-10-07 LAB — SARS-COV-2, NAA: NOT DETECTED

## 2020-10-08 ASSESSMENT — ENCOUNTER SYMPTOMS
RHINORRHEA: 1
EYES NEGATIVE: 1
RESPIRATORY NEGATIVE: 1
ALLERGIC/IMMUNOLOGIC NEGATIVE: 1
SINUS PRESSURE: 1
GASTROINTESTINAL NEGATIVE: 1
SINUS PAIN: 1

## 2020-10-09 ENCOUNTER — HOSPITAL ENCOUNTER (OUTPATIENT)
Age: 50
Setting detail: SPECIMEN
Discharge: HOME | End: 2020-10-09
Payer: COMMERCIAL

## 2020-10-09 ENCOUNTER — HOSPITAL ENCOUNTER (OUTPATIENT)
Age: 50
Setting detail: OUTPATIENT SURGERY
Discharge: HOME | End: 2020-10-09
Attending: OTOLARYNGOLOGY | Admitting: OTOLARYNGOLOGY
Payer: COMMERCIAL

## 2020-10-09 VITALS
RESPIRATION RATE: 16 BRPM | WEIGHT: 186 LBS | SYSTOLIC BLOOD PRESSURE: 160 MMHG | DIASTOLIC BLOOD PRESSURE: 98 MMHG | TEMPERATURE: 97.4 F | HEART RATE: 70 BPM | HEIGHT: 72 IN | OXYGEN SATURATION: 95 % | BODY MASS INDEX: 25.19 KG/M2

## 2020-10-09 VITALS
SYSTOLIC BLOOD PRESSURE: 127 MMHG | TEMPERATURE: 98 F | DIASTOLIC BLOOD PRESSURE: 84 MMHG | OXYGEN SATURATION: 97 % | RESPIRATION RATE: 13 BRPM

## 2020-10-09 PROCEDURE — 30520 REPAIR OF NASAL SEPTUM: CPT

## 2020-10-09 PROCEDURE — 88311 DECALCIFY TISSUE: CPT

## 2020-10-09 PROCEDURE — 30520 REPAIR OF NASAL SEPTUM: CPT | Performed by: OTOLARYNGOLOGY

## 2020-10-09 PROCEDURE — 31256 EXPLORATION MAXILLARY SINUS: CPT

## 2020-10-09 PROCEDURE — 30802 ABLATE INF TURBINATE SUBMUC: CPT | Performed by: OTOLARYNGOLOGY

## 2020-10-09 PROCEDURE — 31254 NSL/SINS NDSC W/PRTL ETHMDCT: CPT | Performed by: OTOLARYNGOLOGY

## 2020-10-09 PROCEDURE — G8907 PT DOC NO EVENTS ON DISCHARG: HCPCS

## 2020-10-09 PROCEDURE — 31254 NSL/SINS NDSC W/PRTL ETHMDCT: CPT

## 2020-10-09 PROCEDURE — C2625 STENT, NON-COR, TEM W/DEL SY: HCPCS | Performed by: OTOLARYNGOLOGY

## 2020-10-09 PROCEDURE — G8916 PT W IV AB GIVEN ON TIME: HCPCS

## 2020-10-09 PROCEDURE — 30802 ABLATE INF TURBINATE SUBMUC: CPT

## 2020-10-09 PROCEDURE — 31256 EXPLORATION MAXILLARY SINUS: CPT | Performed by: OTOLARYNGOLOGY

## 2020-10-09 PROCEDURE — 88305 TISSUE EXAM BY PATHOLOGIST: CPT

## 2020-10-09 DEVICE — PROPEL MINI SINUS IMPLANT
Type: IMPLANTABLE DEVICE | Site: NOSE | Status: FUNCTIONAL
Brand: PROPEL MINI

## 2020-10-09 RX ORDER — CEPHALEXIN 500 MG/1
500 CAPSULE ORAL 3 TIMES DAILY
Qty: 15 CAPSULE | Refills: 0 | Status: SHIPPED | OUTPATIENT
Start: 2020-10-09 | End: 2020-10-14

## 2020-10-09 RX ORDER — DIPHENHYDRAMINE HYDROCHLORIDE 50 MG/ML
12.5 INJECTION INTRAMUSCULAR; INTRAVENOUS
Status: DISCONTINUED | OUTPATIENT
Start: 2020-10-09 | End: 2020-10-09 | Stop reason: HOSPADM

## 2020-10-09 RX ORDER — DEXAMETHASONE SODIUM PHOSPHATE 10 MG/ML
INJECTION, SOLUTION INTRAMUSCULAR; INTRAVENOUS PRN
Status: DISCONTINUED | OUTPATIENT
Start: 2020-10-09 | End: 2020-10-09 | Stop reason: SDUPTHER

## 2020-10-09 RX ORDER — ONDANSETRON 2 MG/ML
INJECTION INTRAMUSCULAR; INTRAVENOUS PRN
Status: DISCONTINUED | OUTPATIENT
Start: 2020-10-09 | End: 2020-10-09 | Stop reason: SDUPTHER

## 2020-10-09 RX ORDER — ONDANSETRON 8 MG/1
8 TABLET, ORALLY DISINTEGRATING ORAL EVERY 8 HOURS PRN
Qty: 6 TABLET | Refills: 1 | Status: SHIPPED | OUTPATIENT
Start: 2020-10-09 | End: 2021-10-08

## 2020-10-09 RX ORDER — HYDROCODONE BITARTRATE AND ACETAMINOPHEN 7.5; 325 MG/1; MG/1
1 TABLET ORAL EVERY 6 HOURS PRN
Qty: 20 TABLET | Refills: 0 | Status: SHIPPED | OUTPATIENT
Start: 2020-10-09 | End: 2020-10-14

## 2020-10-09 RX ORDER — HYDRALAZINE HYDROCHLORIDE 20 MG/ML
5 INJECTION INTRAMUSCULAR; INTRAVENOUS EVERY 10 MIN PRN
Status: DISCONTINUED | OUTPATIENT
Start: 2020-10-09 | End: 2020-10-09 | Stop reason: HOSPADM

## 2020-10-09 RX ORDER — CEFAZOLIN SODIUM 1 G/3ML
INJECTION, POWDER, FOR SOLUTION INTRAMUSCULAR; INTRAVENOUS PRN
Status: DISCONTINUED | OUTPATIENT
Start: 2020-10-09 | End: 2020-10-09 | Stop reason: SDUPTHER

## 2020-10-09 RX ORDER — HYDROMORPHONE HCL 110MG/55ML
0.5 PATIENT CONTROLLED ANALGESIA SYRINGE INTRAVENOUS EVERY 5 MIN PRN
Status: DISCONTINUED | OUTPATIENT
Start: 2020-10-09 | End: 2020-10-09 | Stop reason: HOSPADM

## 2020-10-09 RX ORDER — LIDOCAINE HYDROCHLORIDE 10 MG/ML
INJECTION, SOLUTION INFILTRATION; PERINEURAL PRN
Status: DISCONTINUED | OUTPATIENT
Start: 2020-10-09 | End: 2020-10-09 | Stop reason: SDUPTHER

## 2020-10-09 RX ORDER — LIDOCAINE HYDROCHLORIDE 10 MG/ML
1 INJECTION, SOLUTION EPIDURAL; INFILTRATION; INTRACAUDAL; PERINEURAL
Status: DISCONTINUED | OUTPATIENT
Start: 2020-10-09 | End: 2020-10-09 | Stop reason: HOSPADM

## 2020-10-09 RX ORDER — MORPHINE SULFATE 10 MG/ML
4 INJECTION, SOLUTION INTRAMUSCULAR; INTRAVENOUS EVERY 5 MIN PRN
Status: DISCONTINUED | OUTPATIENT
Start: 2020-10-09 | End: 2020-10-09 | Stop reason: HOSPADM

## 2020-10-09 RX ORDER — MORPHINE SULFATE 10 MG/ML
2 INJECTION, SOLUTION INTRAMUSCULAR; INTRAVENOUS EVERY 5 MIN PRN
Status: DISCONTINUED | OUTPATIENT
Start: 2020-10-09 | End: 2020-10-09 | Stop reason: HOSPADM

## 2020-10-09 RX ORDER — METOCLOPRAMIDE HYDROCHLORIDE 5 MG/ML
10 INJECTION INTRAMUSCULAR; INTRAVENOUS
Status: DISCONTINUED | OUTPATIENT
Start: 2020-10-09 | End: 2020-10-09 | Stop reason: HOSPADM

## 2020-10-09 RX ORDER — ROCURONIUM BROMIDE 10 MG/ML
INJECTION, SOLUTION INTRAVENOUS PRN
Status: DISCONTINUED | OUTPATIENT
Start: 2020-10-09 | End: 2020-10-09 | Stop reason: SDUPTHER

## 2020-10-09 RX ORDER — MEPERIDINE HYDROCHLORIDE 25 MG/ML
12.5 INJECTION INTRAMUSCULAR; INTRAVENOUS; SUBCUTANEOUS EVERY 5 MIN PRN
Status: DISCONTINUED | OUTPATIENT
Start: 2020-10-09 | End: 2020-10-09 | Stop reason: HOSPADM

## 2020-10-09 RX ORDER — HYDROMORPHONE HCL 110MG/55ML
0.25 PATIENT CONTROLLED ANALGESIA SYRINGE INTRAVENOUS EVERY 5 MIN PRN
Status: DISCONTINUED | OUTPATIENT
Start: 2020-10-09 | End: 2020-10-09 | Stop reason: HOSPADM

## 2020-10-09 RX ORDER — LIDOCAINE HYDROCHLORIDE AND EPINEPHRINE 10; 10 MG/ML; UG/ML
INJECTION, SOLUTION INFILTRATION; PERINEURAL PRN
Status: DISCONTINUED | OUTPATIENT
Start: 2020-10-09 | End: 2020-10-09 | Stop reason: ALTCHOICE

## 2020-10-09 RX ORDER — SODIUM CHLORIDE, SODIUM LACTATE, POTASSIUM CHLORIDE, CALCIUM CHLORIDE 600; 310; 30; 20 MG/100ML; MG/100ML; MG/100ML; MG/100ML
INJECTION, SOLUTION INTRAVENOUS CONTINUOUS
Status: DISCONTINUED | OUTPATIENT
Start: 2020-10-09 | End: 2020-10-09 | Stop reason: HOSPADM

## 2020-10-09 RX ORDER — LABETALOL HYDROCHLORIDE 5 MG/ML
5 INJECTION, SOLUTION INTRAVENOUS EVERY 10 MIN PRN
Status: DISCONTINUED | OUTPATIENT
Start: 2020-10-09 | End: 2020-10-09 | Stop reason: HOSPADM

## 2020-10-09 RX ORDER — PROMETHAZINE HYDROCHLORIDE 25 MG/ML
6.25 INJECTION, SOLUTION INTRAMUSCULAR; INTRAVENOUS
Status: DISCONTINUED | OUTPATIENT
Start: 2020-10-09 | End: 2020-10-09 | Stop reason: HOSPADM

## 2020-10-09 RX ORDER — OXYMETAZOLINE HYDROCHLORIDE 0.05 G/100ML
2 SPRAY NASAL 2 TIMES DAILY
Status: COMPLETED | OUTPATIENT
Start: 2020-10-09 | End: 2020-10-09

## 2020-10-09 RX ORDER — LABETALOL 20 MG/4 ML (5 MG/ML) INTRAVENOUS SYRINGE
PRN
Status: DISCONTINUED | OUTPATIENT
Start: 2020-10-09 | End: 2020-10-09 | Stop reason: SDUPTHER

## 2020-10-09 RX ORDER — PROPOFOL 10 MG/ML
INJECTION, EMULSION INTRAVENOUS PRN
Status: DISCONTINUED | OUTPATIENT
Start: 2020-10-09 | End: 2020-10-09 | Stop reason: SDUPTHER

## 2020-10-09 RX ORDER — ENALAPRILAT 2.5 MG/2ML
1.25 INJECTION INTRAVENOUS
Status: DISCONTINUED | OUTPATIENT
Start: 2020-10-09 | End: 2020-10-09 | Stop reason: HOSPADM

## 2020-10-09 RX ORDER — FENTANYL CITRATE 50 UG/ML
INJECTION, SOLUTION INTRAMUSCULAR; INTRAVENOUS PRN
Status: DISCONTINUED | OUTPATIENT
Start: 2020-10-09 | End: 2020-10-09 | Stop reason: SDUPTHER

## 2020-10-09 RX ORDER — OXYMETAZOLINE HYDROCHLORIDE 0.05 G/100ML
SPRAY NASAL PRN
Status: DISCONTINUED | OUTPATIENT
Start: 2020-10-09 | End: 2020-10-09 | Stop reason: ALTCHOICE

## 2020-10-09 RX ORDER — TRIAMCINOLONE ACETONIDE 40 MG/ML
INJECTION, SUSPENSION INTRA-ARTICULAR; INTRAMUSCULAR PRN
Status: DISCONTINUED | OUTPATIENT
Start: 2020-10-09 | End: 2020-10-09 | Stop reason: ALTCHOICE

## 2020-10-09 RX ADMIN — SODIUM CHLORIDE, SODIUM LACTATE, POTASSIUM CHLORIDE, CALCIUM CHLORIDE: 600; 310; 30; 20 INJECTION, SOLUTION INTRAVENOUS at 07:28

## 2020-10-09 RX ADMIN — FENTANYL CITRATE 100 MCG: 50 INJECTION, SOLUTION INTRAMUSCULAR; INTRAVENOUS at 07:55

## 2020-10-09 RX ADMIN — LABETALOL 20 MG/4 ML (5 MG/ML) INTRAVENOUS SYRINGE 10 MG: at 08:51

## 2020-10-09 RX ADMIN — Medication 0.5 MG: at 08:25

## 2020-10-09 RX ADMIN — LIDOCAINE HYDROCHLORIDE 50 MG: 10 INJECTION, SOLUTION INFILTRATION; PERINEURAL at 07:38

## 2020-10-09 RX ADMIN — DEXAMETHASONE SODIUM PHOSPHATE 4 MG: 10 INJECTION, SOLUTION INTRAMUSCULAR; INTRAVENOUS at 07:46

## 2020-10-09 RX ADMIN — ONDANSETRON 4 MG: 2 INJECTION INTRAMUSCULAR; INTRAVENOUS at 08:45

## 2020-10-09 RX ADMIN — SODIUM CHLORIDE, SODIUM LACTATE, POTASSIUM CHLORIDE, CALCIUM CHLORIDE: 600; 310; 30; 20 INJECTION, SOLUTION INTRAVENOUS at 08:32

## 2020-10-09 RX ADMIN — LABETALOL HYDROCHLORIDE 5 MG: 5 INJECTION, SOLUTION INTRAVENOUS at 10:11

## 2020-10-09 RX ADMIN — LABETALOL HYDROCHLORIDE 5 MG: 5 INJECTION, SOLUTION INTRAVENOUS at 10:03

## 2020-10-09 RX ADMIN — CEFAZOLIN SODIUM 1000 MG: 1 INJECTION, POWDER, FOR SOLUTION INTRAMUSCULAR; INTRAVENOUS at 07:46

## 2020-10-09 RX ADMIN — PROPOFOL 150 MG: 10 INJECTION, EMULSION INTRAVENOUS at 07:38

## 2020-10-09 RX ADMIN — FENTANYL CITRATE 100 MCG: 50 INJECTION, SOLUTION INTRAMUSCULAR; INTRAVENOUS at 07:38

## 2020-10-09 RX ADMIN — Medication 0.5 MG: at 08:44

## 2020-10-09 RX ADMIN — ROCURONIUM BROMIDE 50 MG: 10 INJECTION, SOLUTION INTRAVENOUS at 07:38

## 2020-10-09 RX ADMIN — OXYMETAZOLINE HYDROCHLORIDE 2 SPRAY: 0.05 SPRAY NASAL at 07:12

## 2020-10-09 ASSESSMENT — PAIN SCALES - GENERAL
PAINLEVEL_OUTOF10: 0

## 2020-10-09 ASSESSMENT — LIFESTYLE VARIABLES: SMOKING_STATUS: 1

## 2020-10-09 NOTE — OP NOTE
Operative Note      Patient: Gautam Sibley  YOB: 1970  MRN: [id number]    Date of Procedure: 10/9/2020    Pre-Op Diagnosis: OTHER CHRONIC SINUSITIS, DEVIATED NASAL SEPTUM, NASAL TURBINATE HYPERTROPHY    Post-Op Diagnosis: Same       Procedure(s):  Functional Endoscopy sinus surgery, Septoplasty, CAUTER TURBINATE MUCOSA, bilateral INTRAMURAL THERAPEUTIC FRACTURE INFER TURBINATE with radiofrequency ablation, bilateral partial ethmoidectomy, OPEN MAXILLary antrostomy, bilateral propel mini implant    Surgeon(s):  Ricci Cortes MD    Assistant:   * No surgical staff found *    Anesthesia: General    Estimated Blood Loss (mL): less than 50     Complications: None    Specimens:   ID Type Source Tests Collected by Time Destination   A : Left sinus surgery content Tissue Nose SURGICAL PATHOLOGY Ricci Cortes MD 10/9/2020 1094    B : right sinus surgery content Tissue Nose SURGICAL PATHOLOGY Ricci Cortes MD 10/9/2020 0848        Implants:  Implant Name Type Inv. Item Serial No.  Lot No. LRB No. Used Action   IMPL SINUS STEROID RELEASE PROPEL MINI Face/Chin/Dental/Voice IMPL SINUS STEROID RELEASE PROPEL MINI  [employer] [id number] Right 1 Implanted   IMPL SINUS STEROID RELEASE PROPEL MINI Face/Chin/Dental/Voice IMPL SINUS STEROID RELEASE PROPEL MINI  [employer]NT [id number] Left 1 Implanted         Drains: * No LDAs found *    Findings: See brief op note    Detailed Description of Procedure: With the patient under general endotracheal anesthesia, he was prepped and draped in typical fashion for nasal surgery. Topical 4% soaked cottonoids were used to decongest and anesthetize nasal mucosa bilaterally. 1% lidocaine with 1-100,000 epinephrine was used to infiltrate the submucoperichondrial mucoperiosteum and columellar region of the nasal septum bilaterally.   A left-sided hemitransfixion incision was then made and then carefully the mucoperichondrial mucoperiosteum was elevated off of the left side of the septum. There was inferior prominence of the maxillary crest to the left which was shaved with the caudal elevator and chisel. There was marked deflection of the cartilaginous septum to the right. The deviated portion of the septum was incised above and below the deviation and then dissected free. The more superior portion of the cartilaginous septum was scored. This allowed the septum to rotate to a more midline position although the septum was comminuted from prior injury. This resulted in a much more midline position of the septum and allowed access into the right middle meatus region. Endoscopic sinus surgery was then performed in a consistent fashion bilaterally alternating from the left side to the right utilizing Afrin soaked cottonoids in order to minimize blood loss. Attention was directed first toward the left middle meatus. The middle turbinate was medialized and the infundibular region infiltrated with 1% lidocaine with 1 100,000 and apparent. There was a prominent ethmoid bulla. Infundibulotomy incision was made with a sickle knife and then the infant double ectomy performed with forceps. A suction was then used to penetrate into the ethmoid bulla and the ethmoid septations taken down proceeding anteriorly to posteriorly and a more inferior plane and posterior to anteriorly in a more superior plane. Up-biting forceps were then used to clear disease mucosa from the frontal recess area. As stated to minimize blood loss Afrin-soaked cottonoids were utilized as attention was subsequently directed to the right side where a similar procedure was performed. Again the middle meatus was medialized the ethmoid bulla penetrated with a suction and the septations taken down going from anterior to posterior and inferior plane and posterior to anterior to more superior plane respecting superior lateral landmarks.   Again forceps were used to remove diseased mucosa from the frontal

## 2020-10-09 NOTE — ANESTHESIA POSTPROCEDURE EVALUATION
Department of Anesthesiology  Postprocedure Note    Patient: Vicente Reyes  MRN: [id number]  YOB: 1970  Date of evaluation: 10/9/2020  Time:  9:36 AM     Procedure Summary     Date:  10/09/20 Room / Location:  [address]    Anesthesia Start:  8175 Anesthesia Stop:  6597    Procedure:  Functional Endoscopy sinus surgery, Septoplasty, CAUTER TURBINATE MUCOSA, bilateral INTRAMURAL THERAPEUTIC FRACTURE INFER TURBINATE with radiofrequency ablation, bilateral partial ethmoidectomy, OPEN MAXILLary antrostomy, bilateral propel mini implant (N/A Nose) Diagnosis:  (OTHER CHRONIC SINUSITIS, DEVIATED NASAL SEPTUM, NASAL TURBINATE HYPERTROPHY)    Surgeon:  Patricia Parson MD Responsible Provider:  RYLEE Garcia CRNA    Anesthesia Type:  general ASA Status:  2          Anesthesia Type: general    Dianne Phase I:      Dianne Phase II:      Last vitals: Reviewed and per EMR flowsheets.        Anesthesia Post Evaluation    Patient location during evaluation: PACU  Patient participation: complete - patient participated  Level of consciousness: awake and alert  Pain score: 0  Airway patency: patent  Nausea & Vomiting: no nausea and no vomiting  Complications: no  Cardiovascular status: hemodynamically stable  Respiratory status: acceptable  Hydration status: euvolemic

## 2020-10-09 NOTE — H&P
Alex Mock is an 44 y.o.  male. with symptoms of chronic nasal obstruction and a history of chronic sinusitis. He continues to complain of chronic headache facial pain and nasal obstruction which interferes with his sleep. He has been treated with aggressive therapy including oral steroids antibiotics etc. with no real longstanding benefit    Past Medical History:   Diagnosis Date    Chronic back pain     Compression of sciatic nerve     Deviated septum     Hypogonadism male     Sleep apnea     no c-pap       Allergies: No Known Allergies    Active Problems:    * No active hospital problems. *  Resolved Problems:    * No resolved hospital problems. *    There were no vitals taken for this visit. Review of Systems   Constitutional: Negative. HENT: Positive for congestion (Nasal obstruction), rhinorrhea, sinus pressure and sinus pain. Eyes: Negative. Respiratory: Negative. Cardiovascular: Negative. Gastrointestinal: Negative. Endocrine: Negative. Musculoskeletal: Negative. Skin: Negative. Allergic/Immunologic: Negative. Neurological: Negative. Hematological: Negative. Psychiatric/Behavioral: Negative. Physical Exam  Constitutional:       Appearance: Normal appearance. HENT:      Head: Normocephalic and atraumatic. Right Ear: Tympanic membrane normal.      Left Ear: Tympanic membrane normal.      Nose: Septal deviation (Rightward) present. Right Turbinates: Enlarged and swollen. Left Turbinates: Enlarged and swollen. Comments: CT scan shows bilateral ethmoid mucoperiosteal thickening along with bilateral maxillary mucoperiosteal thickening with the left side being more involved than the right     Mouth/Throat:      Pharynx: Oropharynx is clear. Eyes:      Conjunctiva/sclera: Conjunctivae normal.   Neck:      Musculoskeletal: Normal range of motion and neck supple.    Cardiovascular:      Rate and Rhythm: Normal rate and regular rhythm. Pulmonary:      Effort: Pulmonary effort is normal.      Breath sounds: Normal breath sounds. Abdominal:      Palpations: Abdomen is soft. Musculoskeletal: Normal range of motion. Skin:     General: Skin is warm and dry. Neurological:      General: No focal deficit present. Mental Status: He is alert and oriented to person, place, and time.          Assessment:  Deviated nasal septum  Turbinate hypertrophy  Chronic sinusitis    Plan:  Nasal septoplasty  Turbinate surgery  Endoscopic sinus surgery    Liz Sawyer MD  10/8/2020

## 2020-10-09 NOTE — BRIEF OP NOTE
Brief Postoperative Note      Patient: Vicente Reyes  YOB: 1970  MRN: [id number]    Date of Procedure: 10/9/2020    Pre-Op Diagnosis: OTHER CHRONIC SINUSITIS, DEVIATED NASAL SEPTUM, NASAL TURBINATE HYPERTROPHY    Post-Op Diagnosis: Same       Procedure(s):  Functional Endoscopy sinus surgery, Septoplasty, CAUTER TURBINATE MUCOSA, bilateral INTRAMURAL THERAPEUTIC FRACTURE INFER TURBINATE with radiofrequency ablation, bilateral partial ethmoidectomy, OPEN MAXILLary antrostomy, bilateral propel mini implant    Surgeon(s):  Patricia Parson MD    Assistant:  * No surgical staff found *    Anesthesia: General    Estimated Blood Loss (mL): less than 50     Complications: None    Specimens:   ID Type Source Tests Collected by Time Destination   A : Left sinus surgery content Tissue Nose SURGICAL PATHOLOGY Patricia Parson MD 10/9/2020 8551    B : right sinus surgery content Tissue Nose SURGICAL PATHOLOGY Patricia Parson MD 10/9/2020 0848        Implants:  Implant Name Type Inv. Item Serial No.  Lot No. LRB No. Used Action   IMPL SINUS STEROID RELEASE PROPEL MINI Face/Chin/Dental/Voice IMPL SINUS STEROID RELEASE PROPEL MINI  [employer] [id number] Right 1 Implanted   IMPL SINUS STEROID RELEASE PROPEL MINI Face/Chin/Dental/Voice IMPL SINUS STEROID RELEASE PROPEL MINI  INTERSE[employer] ENT [id number] Left 1 Implanted         Drains: * No LDAs found *    Findings: No fluid in either maxillary sinus                  Diffuse mucosal thickening at ethmoids extending to frontal recess.                   Right middle turbinate hypoplastic likely from septal deviation                  Marked septal deviation to right                  Congested inferior turbinates with posterior polypoid degeneration    Electronically signed by Gamal Toledo MD on 10/9/2020 at 9:35 AM

## 2020-10-09 NOTE — ANESTHESIA PRE PROCEDURE
radiculopathy M54.12    Unresponsive R41.89    Other chronic sinusitis J32.8    Deviated nasal septum J34.2    Nasal turbinate hypertrophy J34.3       Past Medical History:        Diagnosis Date    Chronic back pain     Compression of sciatic nerve     Deviated septum     Hypogonadism male     Sleep apnea     no c-pap       Past Surgical History:        Procedure Laterality Date    WRIST SURGERY         Social History:    Social History     Tobacco Use    Smoking status: Current Every Day Smoker     Packs/day: 1.00     Types: Cigarettes    Smokeless tobacco: Never Used   Substance Use Topics    Alcohol use: Yes                                Ready to quit: Not Answered  Counseling given: Not Answered      Vital Signs (Current):   Vitals:    10/09/20 0655   BP: (!) 144/90   Pulse: 84   Resp: 16   SpO2: 93%   Weight: 186 lb (84.4 kg)   Height: 6' (1.829 m)                                              BP Readings from Last 3 Encounters:   10/09/20 (!) 144/90   09/30/20 136/88   09/21/20 136/86       NPO Status: Time of last liquid consumption: 2300                        Time of last solid consumption: 2300                        Date of last liquid consumption: 10/08/20                        Date of last solid food consumption: 10/08/20    BMI:   Wt Readings from Last 3 Encounters:   10/09/20 186 lb (84.4 kg)   09/30/20 190 lb (86.2 kg)   09/21/20 184 lb (83.5 kg)     Body mass index is 25.23 kg/m².     CBC:   Lab Results   Component Value Date    WBC 10.4 09/30/2020    RBC 5.49 09/30/2020    HGB 18.7 09/30/2020    HCT 53.4 09/30/2020    MCV 97.3 09/30/2020    RDW 12.3 09/30/2020     09/30/2020       CMP:   Lab Results   Component Value Date     09/30/2020    K 4.6 09/30/2020    K 3.9 09/29/2019     09/30/2020    CO2 21 09/30/2020    BUN 13 09/30/2020    CREATININE 0.9 09/30/2020    GFRAA >59 09/30/2020    LABGLOM >60 09/30/2020    GLUCOSE 86 09/30/2020    PROT 6.7 09/30/2020    CALCIUM 9.0 09/30/2020    BILITOT <0.2 09/30/2020    ALKPHOS 65 09/30/2020    AST 18 09/30/2020    ALT 20 09/30/2020       POC Tests: No results for input(s): POCGLU, POCNA, POCK, POCCL, POCBUN, POCHEMO, POCHCT in the last 72 hours. Coags: No results found for: PROTIME, INR, APTT    HCG (If Applicable): No results found for: PREGTESTUR, PREGSERUM, HCG, HCGQUANT     ABGs: No results found for: PHART, PO2ART, QGJ3PCB, JAU8RPG, BEART, K4TAYTNA     Type & Screen (If Applicable):  No results found for: LABABO, LABRH    Drug/Infectious Status (If Applicable):  No results found for: HIV, HEPCAB    COVID-19 Screening (If Applicable):   Lab Results   Component Value Date    COVID19 NOT DETECTED 10/03/2020         Anesthesia Evaluation  Patient summary reviewed and Nursing notes reviewed no history of anesthetic complications:   Airway: Mallampati: I  TM distance: >3 FB   Neck ROM: full  Mouth opening: > = 3 FB Dental:          Pulmonary:normal exam    (+) sleep apnea:  current smoker          Patient smoked on day of surgery. Cardiovascular:Negative CV ROS  Exercise tolerance: good (>4 METS),         ECG reviewed               Beta Blocker:  Not on Beta Blocker         Neuro/Psych:   Negative Neuro/Psych ROS              GI/Hepatic/Renal: Neg GI/Hepatic/Renal ROS            Endo/Other: Negative Endo/Other ROS             Pt had no PAT visit       Abdominal:           Vascular:                                        Anesthesia Plan      general     ASA 2       Induction: intravenous. MIPS: Prophylactic antiemetics administered. Anesthetic plan and risks discussed with patient.                       RYLEE Lee - CRNA   10/9/2020

## 2020-10-15 ENCOUNTER — OFFICE VISIT (OUTPATIENT)
Dept: OTOLARYNGOLOGY | Age: 50
End: 2020-10-15

## 2020-10-15 VITALS
DIASTOLIC BLOOD PRESSURE: 84 MMHG | HEIGHT: 72 IN | WEIGHT: 185 LBS | SYSTOLIC BLOOD PRESSURE: 136 MMHG | BODY MASS INDEX: 25.06 KG/M2

## 2020-10-15 PROCEDURE — 99024 POSTOP FOLLOW-UP VISIT: CPT | Performed by: OTOLARYNGOLOGY

## 2020-10-15 NOTE — PROGRESS NOTES
49-year-old male who underwent sinus and nasal surgery on October 9. He comes in today for suctioning of his nasal airway. He has been doing well and has no unusual complaints. Today's visit both nasal airways were suction which resulted in obvious improvement in the ability to breathe through his nose. This was the first time he been able to move air through his nose since the surgery.   Appear to be coming along nicely any return next week for removal of his septal splint

## 2020-10-22 ENCOUNTER — OFFICE VISIT (OUTPATIENT)
Dept: OTOLARYNGOLOGY | Age: 50
End: 2020-10-22
Payer: COMMERCIAL

## 2020-10-22 VITALS
HEIGHT: 72 IN | DIASTOLIC BLOOD PRESSURE: 78 MMHG | WEIGHT: 185 LBS | SYSTOLIC BLOOD PRESSURE: 126 MMHG | BODY MASS INDEX: 25.06 KG/M2

## 2020-10-22 PROCEDURE — 31237 NSL/SINS NDSC SURG BX POLYPC: CPT | Performed by: OTOLARYNGOLOGY

## 2020-10-22 NOTE — ASSESSMENT & PLAN NOTE
Nasal splints removed on today's exam.  Good midline positioning of the septum  Much improved nasal airway

## 2020-10-22 NOTE — ASSESSMENT & PLAN NOTE
Nasal endoscopy with debridement performed on today's visit. Well-tolerated by patient. We will start on daily irrigations.

## 2020-10-22 NOTE — PROGRESS NOTES
78-year-old male returns today for nasal stent removal and sinus debridement. Both septal splints were removed without difficulty. He has good midline positioning of the septum postoperatively and appears of had an excellent postoperative result in that regard. He was aware how much improved nasal airway  Following this the surgically enlarged nasal cavities were debrided under endoscopic guidance. He tolerated this without difficulty.   He will utilize saline irrigations for his sinuses and we will reevaluate him in a couple weeks

## 2020-11-17 ENCOUNTER — OFFICE VISIT (OUTPATIENT)
Dept: OTOLARYNGOLOGY | Age: 50
End: 2020-11-17

## 2020-11-17 VITALS
DIASTOLIC BLOOD PRESSURE: 78 MMHG | HEIGHT: 72 IN | WEIGHT: 196 LBS | SYSTOLIC BLOOD PRESSURE: 138 MMHG | BODY MASS INDEX: 26.55 KG/M2

## 2020-11-17 PROCEDURE — 99024 POSTOP FOLLOW-UP VISIT: CPT | Performed by: OTOLARYNGOLOGY

## 2020-11-17 RX ORDER — MELOXICAM 15 MG/1
15 TABLET ORAL DAILY
COMMUNITY

## 2020-11-17 NOTE — ASSESSMENT & PLAN NOTE
No postoperative visit.   Minimal crusting seen on exam.  Obvious reduction in turbinate bulk resulting in improved nasal airway

## 2020-11-17 NOTE — PROGRESS NOTES
17-year-old male comes in for final postoperative visit related to sent nasal surgery. Overall at this point he is aware of a much improved nasal airway and is quite pleased with the result.   On exam he appears to have healed up nicely with good midline positioning of the septum and obvious reduction in the bulk of his turbinates with an overall much improved nasal airway bilaterally
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
## 2021-04-06 ENCOUNTER — NURSE ONLY (OUTPATIENT)
Dept: PRIMARY CARE CLINIC | Age: 51
End: 2021-04-06
Payer: COMMERCIAL

## 2021-04-06 DIAGNOSIS — R30.0 BURNING WITH URINATION: Primary | ICD-10-CM

## 2021-04-06 LAB
BILIRUBIN, POC: NORMAL
BLOOD URINE, POC: NORMAL
CLARITY, POC: CLEAR
COLOR, POC: YELLOW
GLUCOSE URINE, POC: NORMAL
KETONES, POC: NORMAL
LEUKOCYTE EST, POC: NORMAL
NITRITE, POC: NORMAL
PH, POC: 5
PROTEIN, POC: NORMAL
SPECIFIC GRAVITY, POC: 1.01
UROBILINOGEN, POC: NORMAL

## 2021-04-06 PROCEDURE — 81002 URINALYSIS NONAUTO W/O SCOPE: CPT | Performed by: FAMILY MEDICINE

## 2021-09-23 ENCOUNTER — TELEPHONE (OUTPATIENT)
Dept: NEUROSURGERY | Age: 51
End: 2021-09-23

## 2021-09-27 ENCOUNTER — TELEPHONE (OUTPATIENT)
Dept: NEUROSURGERY | Age: 51
End: 2021-09-27

## 2021-09-27 NOTE — TELEPHONE ENCOUNTER
[facility] New Patient Questionnaire    1. Diagnosis/Reason for Referral?      Cervicalgia  2. Who is completing questionnaire? Patient x Caregiver Family      3. Has the patient had any previous spinal/brain surgeries? no        A. If yes, what is the name of the facility in which the surgery was performed? B. Procedure/Surgery performed? C. Who was the surgeon? D. When was the surgery? MM/YY       E. Did the patient improve after the surgery? 4. Is this a second opinion? If yes, Dr. Burnice Fleischer would like to review patient first before making the appointment. 5. Have MRI Images been obtain within the last year? Yes x  No      XR  CT     If yes, where was the imaging performed? [facility]   If yes, what part of the body? Lumbar  Cervical x  Thoracic  Brain     If yes, when was it obtained? 3 weeks from 9-    Note: if the scan was performed at a facility other than [facility], the disc will need to be brought to the appointment or we need to reach out to obtain the disc. A. Was the patient instructed to provide the disc? Yes  x No      8. Has the patient had a NCV/EMG within the last year? Yes  No x     If yes, where was it performed and date? MM/YY  Location:      9. Has the patient been to Physical Therapy? Yes  No x     If yes, what location, how long attended, and last visit? Location:        Therapy Lasted:    Date of Last Visit:      10. Has the patient been to Pain Management? Yes  No x      If yes, what location and last visit     Location:   Last Visit:   Is it helping?

## 2021-10-08 ENCOUNTER — OFFICE VISIT (OUTPATIENT)
Dept: NEUROSURGERY | Age: 51
End: 2021-10-08
Payer: OTHER GOVERNMENT

## 2021-10-08 VITALS
HEIGHT: 72 IN | HEART RATE: 92 BPM | WEIGHT: 184 LBS | BODY MASS INDEX: 24.92 KG/M2 | OXYGEN SATURATION: 95 % | DIASTOLIC BLOOD PRESSURE: 85 MMHG | SYSTOLIC BLOOD PRESSURE: 138 MMHG

## 2021-10-08 DIAGNOSIS — M50.30 DDD (DEGENERATIVE DISC DISEASE), CERVICAL: ICD-10-CM

## 2021-10-08 DIAGNOSIS — M48.02 FORAMINAL STENOSIS OF CERVICAL REGION: ICD-10-CM

## 2021-10-08 DIAGNOSIS — R51.9 SEVERE HEADACHE: Primary | ICD-10-CM

## 2021-10-08 DIAGNOSIS — M48.02 CERVICAL STENOSIS OF SPINAL CANAL: ICD-10-CM

## 2021-10-08 DIAGNOSIS — R51.9 LEFT FACIAL PAIN: ICD-10-CM

## 2021-10-08 PROCEDURE — 99204 OFFICE O/P NEW MOD 45 MIN: CPT | Performed by: NURSE PRACTITIONER

## 2021-10-08 ASSESSMENT — ENCOUNTER SYMPTOMS
RESPIRATORY NEGATIVE: 1
GASTROINTESTINAL NEGATIVE: 1
EYES NEGATIVE: 1

## 2021-10-08 NOTE — PROGRESS NOTES
[facility]  Office Visit      Chief Complaint   Patient presents with    Referral - General    Neck Pain       HISTORY OF PRESENT ILLNESS:    Frances Goyal is a 46 y.o. male who presents with neck pain and facial pain that has been ongoing for about 2 years. The pain does radiate into the left occipital region, left jar, cheek, eyes. He has always thought this was a sinus, TMJ problem. States he had a sinus procedure per Dr. Yvonne Pedroza about 1 year ago with no improvement in symptoms. His pain is mostly located in the left neck, head, and face. No ear pain. He describes this as a severe pressure like feeling. The patient denies numbness. Denies any radicular arm pains. He does not have numbness in the fingertips, trouble using hands to perform fine motor tasks or ataxia. The episodes last for about 30 minutes to 90 minutes at a time and occur at least every other day. He has found nothing to relieve the symptoms. He denies any vision changes, weakness, or cognitive changes. The wife states that he tends to clench his jaws when he is stressed out. The patient has underwent a non-operative treatment course that has included:  NSAIDs (Mobic)  Dry needing, acupuncture   Massage Therapy  Chiropractic Manipulation    Of note he does use tobacco and does not take blood thinning medications.                Past Medical History:   Diagnosis Date    Chronic back pain     Compression of sciatic nerve     Deviated septum     Hypogonadism male     Sleep apnea     no c-pap       Past Surgical History:   Procedure Laterality Date    SINUS ENDOSCOPY N/A 10/9/2020    Functional Endoscopy sinus surgery, Septoplasty, CAUTER TURBINATE MUCOSA, bilateral INTRAMURAL THERAPEUTIC FRACTURE INFER TURBINATE with radiofrequency ablation, bilateral partial ethmoidectomy, OPEN MAXILLary antrostomy, bilateral propel mini implant performed by Alaina Barrett MD at [address] [place] [address]         Current Outpatient Medications   Medication Sig Dispense Refill    meloxicam (MOBIC) 15 MG tablet Take 15 mg by mouth daily      DULoxetine (CYMBALTA) 30 MG extended release capsule Take 30 mg by mouth daily       No current facility-administered medications for this visit. Allergies:  Patient has no known allergies. Social History:   Social History     Tobacco Use   Smoking Status Current Every Day Smoker    Packs/day: 1.00    Types: Cigarettes   Smokeless Tobacco Never Used     Social History     Substance and Sexual Activity   Alcohol Use Yes         Family History:   History reviewed. No pertinent family history. REVIEW OF SYSTEMS:  Constitutional: Negative. HENT: Negative. Eyes: Negative. Respiratory: Negative. Cardiovascular: Negative. Gastrointestinal: Negative. Genitourinary: Negative. Musculoskeletal: Positive for myalgias and neck pain. Skin: Negative. Neurological: Positive for headaches. Endo/Heme/Allergies: Negative. Psychiatric/Behavioral: Negative. PHYSICAL EXAM:  Vitals:    10/08/21 1035   BP: 138/85   Pulse: 92   SpO2: 95%     Constitutional: appears well-developed and well-nourished. Eyes - conjunctiva normal.  Pupils react to light  Ear, nose, throat - hearing intact to finger rub, No scars, masses, or lesions over external nose or ears, no atrophy oftongue  Neck- symmetric, no masses noted, no jugular vein distension  Respiration- chest wall appears symmetric, good expansion, normal effort without use of accessory muscles  Musculoskeletal - no significant wasting of muscles noted, no bony deformities, gait no gross ataxia  Extremities- no clubbing, cyanosis oredema  Skin - warm, dry, and intact. No rash, erythema, or pallor.   Psychiatric - mood, affect, and behavior appear normal.     Neurologic Examination  Awake, Alert and oriented x 4  Normal speech pattern, following commands'  CN II-XII grossly intact; slight decrease sensation to the left face    Motor:  RIGHT: hand grasp 5/5    finger extension 5/5    bicep 5/5    triceps 5/5    deltoid 5/5      LEFT:   hand grasp 5/5    finger extension 5/5    bicep 5/5    triceps 5/5    deltoid 5/5      No deficits to light touch or pinprick sensation  Reflexes are 2+ and symmetric  No clonus or Hoffmans sign  No myofacial tenderness to palpation  Normal Gait pattern        DATA and IMAGING:    Nursing/pcp notes, imaging, labs, and vitals reviewed. PT,OT and/or speech notes reviewed    Lab Results   Component Value Date    WBC 10.4 09/30/2020    HGB 18.7 (H) 09/30/2020    HCT 53.4 (H) 09/30/2020    MCV 97.3 (H) 09/30/2020     09/30/2020     Lab Results   Component Value Date     09/30/2020    K 4.6 09/30/2020     09/30/2020    CO2 21 (L) 09/30/2020    BUN 13 09/30/2020    CREATININE 0.9 09/30/2020    GLUCOSE 86 09/30/2020    CALCIUM 9.0 09/30/2020    PROT 6.7 09/30/2020    LABALBU 4.3 09/30/2020    BILITOT <0.2 09/30/2020    ALKPHOS 65 09/30/2020    AST 18 09/30/2020    ALT 20 09/30/2020    LABGLOM >60 09/30/2020    GFRAA >59 09/30/2020   No results found for: INR, PROTIME    XR Cervical Spine (8/27/2021)   I have personally reviewed the images and my interpretation is:  DDD throughout worse at C4-5        MRI Cervical Spine (9/10/2021) [address] [facility]  I have personally reviewed these images and my interpretation is: There is DDD throughout  C3-4 moderate right foraminal stenosis, moderate canal stenosis, canal measures 7mm  C4-5 severe bilateral foraminal stenosis, mild to moderate canal stenosis, canal measures 7-8mm  C6-7 moderate bilateral foraminal stenosis          ASSESSMENT:    Edson Walker is a 46 y.o. male with complaints of left sided neck, facial pain. ICD-10-CM    1. Severe headache  R51.9 Ness Laureano MD, Neurology, [place]   2.  Left facial pain  R51.9 Ness Laureano MD, Neurology, [place]       PLAN:  I have discussed and reviewed the results of the MRI cervical spine with Mr. Marisela Burkitt at length. I explained that he does have DDD throughout his cervical spine, he does have significant stenosis at a few levels, however, his pain pattern does not correlate with the stenosis. His symptoms sound more similar to those of a trigeminal neuralgia. He has never seen a neurologist or taken any headache management medications.   At this point, I do not recommend discussing any neurosurgical intervention for the cervical spine.    -Refer to Neurology for headache management, possible trigeminal neuralgia  -Follow up 3 months           RYLEE Young

## 2021-10-11 ENCOUNTER — TELEPHONE (OUTPATIENT)
Dept: NEUROLOGY | Age: 51
End: 2021-10-11

## 2021-10-11 NOTE — TELEPHONE ENCOUNTER
Received a referral from [facility] office for this patient. Called and left patient a  to let him know when I have him scheduled an appointment with Dr. Vinh Garcia. Left on VM the appointment time/date/phone #.

## 2021-12-16 ENCOUNTER — TELEPHONE (OUTPATIENT)
Dept: NEUROLOGY | Age: 51
End: 2021-12-16

## 2021-12-16 ENCOUNTER — OFFICE VISIT (OUTPATIENT)
Dept: NEUROLOGY | Age: 51
End: 2021-12-16
Payer: OTHER GOVERNMENT

## 2021-12-16 VITALS
HEIGHT: 72 IN | HEART RATE: 72 BPM | DIASTOLIC BLOOD PRESSURE: 77 MMHG | SYSTOLIC BLOOD PRESSURE: 127 MMHG | BODY MASS INDEX: 24.92 KG/M2 | WEIGHT: 184 LBS

## 2021-12-16 DIAGNOSIS — M54.12 CERVICAL RADICULOPATHY: ICD-10-CM

## 2021-12-16 DIAGNOSIS — R51.9 NONINTRACTABLE HEADACHE, UNSPECIFIED CHRONICITY PATTERN, UNSPECIFIED HEADACHE TYPE: Primary | ICD-10-CM

## 2021-12-16 PROCEDURE — 99214 OFFICE O/P EST MOD 30 MIN: CPT | Performed by: PSYCHIATRY & NEUROLOGY

## 2021-12-16 NOTE — PROGRESS NOTES
Chief Complaint   Patient presents with    Follow-up     referred back for headache and left facial pain, only recent MRI was Cervical Spine        Heath Whitten is a 46y.o. year old male who is seen for evaluation of right upper arm and pectoral pain in 2017. The patient indicates approximately 1 month prior to his initial visit he was at work. He was wearing a harness and lifting a cable over his head to the right. He twisted and turned his head and neck to the right and had a shooting pain in his upper arm. This moved into the right armpit and into the right pectoral region. This lasted several hours. Since that time he has had some aching pain in the right upper arm and pectoral region. This has improved. He was on Neurontin. He denied any clear weakness or numbness. His MRI of the cervical region revealed some uncinate spurring at C3-C4, extending into the right intravertebral foramen. He denied any weakness in the left arm. He denied involvement of the legs. He has been lost to follow up since 2017. End of 2019 and felt strange and fell back. Saying things that were incoherent for a few minutes. He then got wobbly and looked lethargic and fell back in chair and then leaned forward and throw up and then leaned back and passed out and then eyes tolled back then made strange noise and foam coming out of mouth, Then stiffened up and then limp. Thought he had . Episodes lasted 3-4 minutes. he was unresponsive for 3-4 minutes. Wife thumped him the chest and he started coming to. His blood pressure was 80/60. He was confused for a few hours and he does not recall was happened. Loss of bowel function. Headaches for 2 1/2 years. Based of the skull on left. Alcohol makes it worse. Moves across the side of the head and into his left eye. No photophobia, phonophobia, nausea. No significant neck pain. When look up and yawn really wide and help. At least 3 times a week. 5-30 minutes.      Active Ambulatory Problems     Diagnosis Date Noted    Vitamin D deficiency 04/13/2015    Pain in right arm 03/03/2017    Chest pain 03/03/2017    Cervical radiculopathy 03/03/2017    Unresponsive 11/14/2019    Other chronic sinusitis 06/11/2020    Deviated nasal septum 07/23/2020    Nasal turbinate hypertrophy 07/23/2020    Nonintractable headache 12/16/2021     Resolved Ambulatory Problems     Diagnosis Date Noted    No Resolved Ambulatory Problems     Past Medical History:   Diagnosis Date    Chronic back pain     Compression of sciatic nerve     Deviated septum     Hypogonadism male     Sleep apnea        Past Surgical History:   Procedure Laterality Date    SINUS ENDOSCOPY N/A 10/9/2020    Functional Endoscopy sinus surgery, Septoplasty, CAUTER TURBINATE MUCOSA, bilateral INTRAMURAL THERAPEUTIC FRACTURE INFER TURBINATE with radiofrequency ablation, bilateral partial ethmoidectomy, OPEN MAXILLary antrostomy, bilateral propel mini implant performed by Milton Billings MD at [address] [place] [address] SURGERY         History reviewed. No pertinent family history. No Known Allergies    Social History     Socioeconomic History    Marital status:      Spouse name: Not on file    Number of children: Not on file    Years of education: Not on file    Highest education level: Not on file   Occupational History    Not on file   Tobacco Use    Smoking status: Current Every Day Smoker     Packs/day: 1.00     Types: Cigarettes    Smokeless tobacco: Never Used   Vaping Use    Vaping Use: Former   Substance and Sexual Activity    Alcohol use:  Yes    Drug use: No    Sexual activity: Yes     Partners: Female   Other Topics Concern    Not on file   Social History Narrative    Not on file     Social Determinants of Health     Financial Resource Strain:     Difficulty of Paying Living Expenses: Not on file   Food Insecurity:     Worried About Running Out of Food in the Last Year: Not on file    [employer] Food in the Last Year: Not on file   Transportation Needs:     Lack of Transportation (Medical): Not on file    Lack of Transportation (Non-Medical): Not on file   Physical Activity:     Days of Exercise per Week: Not on file    Minutes of Exercise per Session: Not on file   Stress:     Feeling of Stress : Not on file   Social Connections:     Frequency of Communication with Friends and Family: Not on file    Frequency of Social Gatherings with Friends and Family: Not on file    Attends Buddhist Services: Not on file    Active Member of [address] [place] [address] [employer] or Organizations: Not on file    Attends Club or Organization Meetings: Not on file    Marital Status: Not on file   Intimate Partner Violence:     Fear of Current or Ex-Partner: Not on file    Emotionally Abused: Not on file    Physically Abused: Not on file    Sexually Abused: Not on file   Housing Stability:     Unable to Pay for Housing in the Last Year: Not on file    Number of [place] in the Last Year: Not on file    Unstable Housing in the Last Year: Not on file     Review of Systems     Constitutional - No fever or chills. No diaphoresis or significant fatigue. HENT -  No tinnitus or significant hearing loss. Eyes - no sudden vision change or eye pain  Respiratory - no significant shortness of breath or cough  Cardiovascular - no chest pain No palpitations or significant leg swelling  Gastrointestinal - no abdominal swelling or pain. Genitourinary - No difficulty urinating, dysuria  Musculoskeletal - no back pain or myalgia. Skin - no color change or rash  Neurologic - No seizures. No lateralizing weakness. Hematologic - no easy bruising or excessive bleeding. Psychiatric - yes severe anxiety or nervousness. All other review of systems are negative.     Current Outpatient Medications   Medication Sig Dispense Refill    meloxicam (MOBIC) 15 MG tablet Take 15 mg by mouth daily      DULoxetine (CYMBALTA) 30 MG extended release capsule Impression   1. No evidence of acute infarct. 2. No signal abnormality within the brain. The temporal lobes are   symmetric with no signal abnormality. 3. Mucosal inflammatory changes of the paranasal sinuses. Signed by Dr Oscar Parham on 12/6/2019 10:06 AM         Impression   Negative CT angiogram of the cervical segment of the carotid arteries. There is no significant stenosis. Signed by Dr Zuleyma Rose on 12/6/2019 8:45 AM     Impression   Impression:    1. Negative CT angiogram at the level of [facility]   2. Mucosal edema in the ethmoid air cells. .    Signed by Dr Zuleyma Rose on 12/6/2019 8:35 AM     Narrative     Mario Alberto Schilling MD     12/8/2019  8:32 AM  Patient:   Rohan Olivarez  MR#:    [id number]   Room:    Room/bed info not found   Date of Birth:   1970  Date of Progress Note: 12/8/2019  Time of Note                           8:32 AM  Consulting Physician:   Mario Alberto Schilling M.D. Attending Physician:  No att. providers found         This is a multichannel digital EEG recording using the   international 10-20 placement system. Technical Summary:     Background EEG activity: The occipital dominant rhythm is 9-10   Hz. There is much low to moderate voltage 7-9 Hz activity seen in   a generalized distribution intermixed with low voltage 18-22 Hz   activity. Photic Stimulation: No abnormal waveforms are noted with various   frequencies of flickering light. IMPRESSION:   This is a normal awake EEG. No clear epileptiform activity was   noted during the recording period. Dr Court Carolina Certified in Neurology  Board Certified in [address] [place] [address] trained in [address] [place] [address] Time 62 minutes         Assessment    ICD-10-CM    1.  Nonintractable headache, unspecified chronicity pattern, unspecified headache type  R51.9 MRI BRAIN WO CONTRAST     XR SKULL (MIN 4 VIEWS)   2. Cervical radiculopathy  M54.12        His

## 2021-12-16 NOTE — PROGRESS NOTES

## 2021-12-22 ENCOUNTER — HOSPITAL ENCOUNTER (OUTPATIENT)
Dept: GENERAL RADIOLOGY | Age: 51
Discharge: HOME | End: 2021-12-22
Payer: OTHER GOVERNMENT

## 2021-12-22 ENCOUNTER — HOSPITAL ENCOUNTER (OUTPATIENT)
Dept: MRI IMAGING | Age: 51
Discharge: HOME | End: 2021-12-22
Payer: OTHER GOVERNMENT

## 2021-12-22 DIAGNOSIS — R51.9 NONINTRACTABLE HEADACHE, UNSPECIFIED CHRONICITY PATTERN, UNSPECIFIED HEADACHE TYPE: ICD-10-CM

## 2021-12-22 PROCEDURE — 70260 X-RAY EXAM OF SKULL: CPT

## 2021-12-22 PROCEDURE — 70551 MRI BRAIN STEM W/O DYE: CPT

## 2022-01-06 ENCOUNTER — OFFICE VISIT (OUTPATIENT)
Dept: NEUROSURGERY | Age: 52
End: 2022-01-06
Payer: OTHER GOVERNMENT

## 2022-01-06 VITALS
DIASTOLIC BLOOD PRESSURE: 89 MMHG | HEIGHT: 72 IN | SYSTOLIC BLOOD PRESSURE: 143 MMHG | WEIGHT: 190 LBS | HEART RATE: 75 BPM | BODY MASS INDEX: 25.73 KG/M2 | OXYGEN SATURATION: 98 %

## 2022-01-06 DIAGNOSIS — R51.9 LEFT FACIAL PAIN: ICD-10-CM

## 2022-01-06 DIAGNOSIS — M48.02 CERVICAL STENOSIS OF SPINAL CANAL: ICD-10-CM

## 2022-01-06 DIAGNOSIS — R51.9 SEVERE HEADACHE: Primary | ICD-10-CM

## 2022-01-06 DIAGNOSIS — M50.30 DDD (DEGENERATIVE DISC DISEASE), CERVICAL: ICD-10-CM

## 2022-01-06 DIAGNOSIS — M48.02 FORAMINAL STENOSIS OF CERVICAL REGION: ICD-10-CM

## 2022-01-06 PROCEDURE — 99214 OFFICE O/P EST MOD 30 MIN: CPT | Performed by: NEUROLOGICAL SURGERY

## 2022-01-06 ASSESSMENT — ENCOUNTER SYMPTOMS
RESPIRATORY NEGATIVE: 1
GASTROINTESTINAL NEGATIVE: 1
EYES NEGATIVE: 1

## 2022-01-06 NOTE — PROGRESS NOTES
[facility]  Office Visit      Chief Complaint   Patient presents with    Follow-up       1/6/2022:  Mr. Heather Lind returns to the clinic today. He was seen by the neurology team:  MRI brain, EEG and CTA head and neck have been unremarkable. He is scheduled on 1/10/2022 for an occipital nerve block. He continues to have severe headaches that start on the left occiput and will radiate into the ear, jaw, temple. No pain on the right. The pain is intermittent. The pain is mostly between 6pm and 9pm at night at home. The pain can last anywhere between 10 - 60 minutes. HISTORY OF PRESENT ILLNESS:    Boone Gee is a 46 y.o. male who presents with neck pain and facial pain that has been ongoing for about 2 years. The pain does radiate into the left occipital region, left jaw, cheek, eyes. He has always thought this was a sinus, TMJ problem. States he had a sinus procedure per Dr. Norris Castañeda about 1 year ago with no improvement in symptoms. His pain is mostly located in the left neck, head, and face. No ear pain. He describes this as a severe pressure like feeling. The patient denies numbness. Denies any radicular arm pains. He does not have numbness in the fingertips, trouble using hands to perform fine motor tasks or ataxia. The episodes last for about 30 minutes to 90 minutes at a time and occur at least every other day. He has found nothing to relieve the symptoms. He denies any vision changes, weakness, or cognitive changes. The wife states that he tends to clench his jaws when he is stressed out. The patient has underwent a non-operative treatment course that has included:  NSAIDs (Mobic)  Dry needing, acupuncture   Massage Therapy  Chiropractic Manipulation    Of note he does use tobacco and does not take blood thinning medications.                Past Medical History:   Diagnosis Date    Chronic back pain     Compression of sciatic nerve     Deviated septum     Hypogonadism male     Sleep apnea     no c-pap       Past Surgical History:   Procedure Laterality Date    SINUS ENDOSCOPY N/A 10/9/2020    Functional Endoscopy sinus surgery, Septoplasty, CAUTER TURBINATE MUCOSA, bilateral INTRAMURAL THERAPEUTIC FRACTURE INFER TURBINATE with radiofrequency ablation, bilateral partial ethmoidectomy, OPEN MAXILLary antrostomy, bilateral propel mini implant performed by Sandee Claude, MD at [address] [place] [address]         Current Outpatient Medications   Medication Sig Dispense Refill    meloxicam (MOBIC) 15 MG tablet Take 15 mg by mouth daily      DULoxetine (CYMBALTA) 30 MG extended release capsule Take 30 mg by mouth daily       No current facility-administered medications for this visit. Allergies:  Patient has no known allergies. Social History:   Social History     Tobacco Use   Smoking Status Current Every Day Smoker    Packs/day: 1.00    Types: Cigarettes   Smokeless Tobacco Never Used     Social History     Substance and Sexual Activity   Alcohol Use Yes         Family History:   History reviewed. No pertinent family history. REVIEW OF SYSTEMS:  Constitutional: Negative. HENT: Negative. Eyes: Negative. Respiratory: Negative. Cardiovascular: Negative. Gastrointestinal: Negative. Genitourinary: Negative. Musculoskeletal: Negative. Skin: Negative. Neurological: Negative. Endo/Heme/Allergies: Negative. Psychiatric/Behavioral: Negative. PHYSICAL EXAM:  Vitals:    01/06/22 0856   BP: (!) 143/89   Pulse: 75   SpO2: 98%     Constitutional: appears well-developed and well-nourished.    Eyes - conjunctiva normal.  Pupils react to light  Ear, nose, throat - hearing intact to finger rub, No scars, masses, or lesions over external nose or ears, no atrophy oftongue  Neck- symmetric, no masses noted, no jugular vein distension  Respiration- chest wall appears symmetric, good expansion, normal effort without use of accessory muscles  Musculoskeletal - no significant wasting of muscles noted, no bony deformities, gait no gross ataxia  Extremities- no clubbing, cyanosis oredema  Skin - warm, dry, and intact. No rash, erythema, or pallor. Psychiatric - mood, affect, and behavior appear normal.     Neurologic Examination  Awake, Alert and oriented x 4  Normal speech pattern, following commands  CN II-XII grossly intact; slight decrease sensation to the left face    Motor:  RIGHT: hand grasp 5/5    finger extension 5/5    bicep 5/5    triceps 5/5    deltoid 5/5      LEFT:   hand grasp 5/5    finger extension 5/5    bicep 5/5    triceps 5/5    deltoid 5/5      No deficits to light touch or pinprick sensation  Reflexes are 2+ and symmetric  No clonus or Hoffmans sign  No myofacial tenderness to palpation  Normal Gait pattern        DATA and IMAGING:    Nursing/pcp notes, imaging, labs, and vitals reviewed. PT,OT and/or speech notes reviewed    Lab Results   Component Value Date    WBC 10.4 09/30/2020    HGB 18.7 (H) 09/30/2020    HCT 53.4 (H) 09/30/2020    MCV 97.3 (H) 09/30/2020     09/30/2020     Lab Results   Component Value Date     09/30/2020    K 4.6 09/30/2020     09/30/2020    CO2 21 (L) 09/30/2020    BUN 13 09/30/2020    CREATININE 0.9 09/30/2020    GLUCOSE 86 09/30/2020    CALCIUM 9.0 09/30/2020    PROT 6.7 09/30/2020    LABALBU 4.3 09/30/2020    BILITOT <0.2 09/30/2020    ALKPHOS 65 09/30/2020    AST 18 09/30/2020    ALT 20 09/30/2020    LABGLOM >60 09/30/2020    GFRAA >59 09/30/2020   No results found for: INR, PROTIME    XR Cervical Spine (8/27/2021)   I have personally reviewed the images and my interpretation is:  DDD throughout worse at C4-5        MRI Cervical Spine (9/10/2021) [address] [facility]  I have personally reviewed these images and my interpretation is:   There is DDD throughout  C3-4 moderate right foraminal stenosis, moderate canal stenosis, canal measures 7mm  C4-5 severe bilateral foraminal stenosis, mild to moderate canal stenosis, canal measures 7-8mm  C6-7 moderate bilateral foraminal stenosis        EXAM: MR BRAIN WITHOUT IV CONTRAST 12/22/2021   COMPARISON: MRI brain dated December 6, 2019. HISTORY: 46years-old Male. Headache. TECHNIQUE:    Routine pulse sequences of the brain were obtained without IV   contrast.    REPORT:    The ventricles and cerebrospinal fluid spaces are normal in size and   configuration for the patient's age. There is no evidence of   mass-effect or midline shift. No reduced diffusivity is demonstrated. The brainstem, sella, pituitary, cerebellum are unremarkable. The   basal cisterns are preserved. No acute osseous lesion. The intraorbital structures are unremarkable. The flow voids are preserved.     Mastoid air cells are clear. The paranasal sinuses are free of   obstructive mucosal disease.        Impression   Unremarkable MRI of the brain for age. Signed by Dr Hattie Thompson     I have personally reviewed the images and my interpretation is:  No evidence of any acute intracranial process. ASSESSMENT:    Viola Alvarado is a 46 y.o. male with complaints of left sided neck, facial pain. ICD-10-CM    1. Severe headache  R51.9    2. Left facial pain  R51.9    3. Foraminal stenosis of cervical region  M48.02    4. Cervical stenosis of spinal canal  M48.02    5. DDD (degenerative disc disease), cervical  M50.30        PLAN:  We have discussed and reviewed the results of the most recent tests with Mr. Tenzin Alvarez and his spouse at length. We explained that he does have DDD throughout his cervical spine, he does have significant stenosis at a few levels, however, his pain pattern does not correlate with the stenosis. His symptoms are more consistent with a trigeminal neuralgia.  He definitely does not need a cervical spine surgery.      -Follow up as needed          Court Zelaya DO

## 2022-01-10 ENCOUNTER — HOSPITAL ENCOUNTER (OUTPATIENT)
Dept: PAIN MANAGEMENT | Age: 52
Discharge: HOME | End: 2022-01-10
Payer: OTHER GOVERNMENT

## 2022-01-10 VITALS — HEART RATE: 69 BPM | OXYGEN SATURATION: 95 % | RESPIRATION RATE: 18 BRPM | TEMPERATURE: 97.6 F

## 2022-01-10 PROCEDURE — 2500000003 HC RX 250 WO HCPCS

## 2022-01-10 PROCEDURE — 64405 NJX AA&/STRD GR OCPL NRV: CPT | Performed by: PSYCHIATRY & NEUROLOGY

## 2022-01-10 PROCEDURE — 64450 NJX AA&/STRD OTHER PN/BRANCH: CPT | Performed by: PSYCHIATRY & NEUROLOGY

## 2022-01-10 PROCEDURE — 64405 NJX AA&/STRD GR OCPL NRV: CPT

## 2022-01-10 RX ORDER — BUPIVACAINE HYDROCHLORIDE 2.5 MG/ML
5 INJECTION, SOLUTION EPIDURAL; INFILTRATION; INTRACAUDAL ONCE
Status: DISCONTINUED | OUTPATIENT
Start: 2022-01-10 | End: 2022-01-12 | Stop reason: HOSPADM

## 2022-01-10 NOTE — PROGRESS NOTES
Procedure:  Level of Consciousness: [x]Alert [x]Oriented []Disoriented []Lethargic  Anxiety Level: [x]Calm []Anxious []Depressed []Other  Skin: [x]Warm [x]Dry []Cool []Moist []Intact []Other  Cardiovascular: [x]Palpitations: [x]Never []Occasionally []Frequently  Chest Pain: [x]No []Yes  Respiratory:  [x]Unlabored []Labored []Cough ([] Productive []Unproductive)  HCG Required: [x]No []Yes   Results: []Negative []Positive  Knowledge Level:        [x]Patient/Other verbalized understanding of pre-procedure instructions. [x]Assessment of post-op care needs (transportation, responsible caregiver)        [x]Able to discuss health care problems and how to deal with it.   Factors that Affect Teaching:        Language Barrier: [x]No []Yes - why:        Hearing Loss:        [x]No []Yes            Corrective Device:  []Yes []No        Vision Loss:           [x]No []Yes            Corrective Device:  []Yes []No        Memory Loss:       [x]No []Yes            []Short Term []Long Term  Motivational Level:  [x]Asks Questions                  []Extremely Anxious       []Seems Interested               []Seems Uninterested                  []Denies need for Education  Risk for Injury:  []Patient oriented to person, place and time  []History of frequent falls/loss of balance  Nutritional:  []Change in appetite   []Weight Gain   []Weight Loss  Functional:  []Requires assistance with ADL's

## 2022-10-17 ENCOUNTER — OFFICE VISIT (OUTPATIENT)
Dept: PRIMARY CARE CLINIC | Age: 52
End: 2022-10-17
Payer: COMMERCIAL

## 2022-10-17 VITALS
DIASTOLIC BLOOD PRESSURE: 70 MMHG | SYSTOLIC BLOOD PRESSURE: 122 MMHG | HEIGHT: 72 IN | HEART RATE: 68 BPM | TEMPERATURE: 98.1 F | WEIGHT: 190.2 LBS | OXYGEN SATURATION: 98 % | BODY MASS INDEX: 25.76 KG/M2

## 2022-10-17 DIAGNOSIS — J32.8 OTHER CHRONIC SINUSITIS: Primary | ICD-10-CM

## 2022-10-17 DIAGNOSIS — H92.02 LEFT EAR PAIN: ICD-10-CM

## 2022-10-17 PROCEDURE — 99213 OFFICE O/P EST LOW 20 MIN: CPT | Performed by: FAMILY MEDICINE

## 2022-10-17 RX ORDER — DULOXETIN HYDROCHLORIDE 20 MG/1
CAPSULE, DELAYED RELEASE ORAL
COMMUNITY
Start: 2022-08-24 | End: 2022-10-17 | Stop reason: DRUGHIGH

## 2022-10-17 RX ORDER — LORATADINE 10 MG/1
10 CAPSULE, LIQUID FILLED ORAL DAILY
COMMUNITY

## 2022-10-17 SDOH — ECONOMIC STABILITY: FOOD INSECURITY: WITHIN THE PAST 12 MONTHS, THE FOOD YOU BOUGHT JUST DIDN'T LAST AND YOU DIDN'T HAVE MONEY TO GET MORE.: NEVER TRUE

## 2022-10-17 SDOH — ECONOMIC STABILITY: FOOD INSECURITY: WITHIN THE PAST 12 MONTHS, YOU WORRIED THAT YOUR FOOD WOULD RUN OUT BEFORE YOU GOT MONEY TO BUY MORE.: NEVER TRUE

## 2022-10-17 ASSESSMENT — ENCOUNTER SYMPTOMS
SINUS PRESSURE: 1
COUGH: 0
ABDOMINAL PAIN: 0
CHEST TIGHTNESS: 0
BLOOD IN STOOL: 0
RHINORRHEA: 1
SHORTNESS OF BREATH: 0
WHEEZING: 0

## 2022-10-17 ASSESSMENT — PATIENT HEALTH QUESTIONNAIRE - PHQ9
SUM OF ALL RESPONSES TO PHQ9 QUESTIONS 1 & 2: 2
SUM OF ALL RESPONSES TO PHQ QUESTIONS 1-9: 2
2. FEELING DOWN, DEPRESSED OR HOPELESS: 0
1. LITTLE INTEREST OR PLEASURE IN DOING THINGS: 2

## 2022-10-17 ASSESSMENT — SOCIAL DETERMINANTS OF HEALTH (SDOH): HOW HARD IS IT FOR YOU TO PAY FOR THE VERY BASICS LIKE FOOD, HOUSING, MEDICAL CARE, AND HEATING?: NOT HARD AT ALL

## 2022-10-17 NOTE — PROGRESS NOTES
Pamela Santos (:  1970) is a 45 y.o. male,Established patient, here for evaluation of the following chief complaint(s):  Otalgia (Pt voices that this has been going on for 3 months and that he was seen at [facility] and they gave him a Zpk and it did not seem to help. He has also been on Claritan for a couple of weeks and does not seem to be doing anything)         ASSESSMENT/PLAN:  1. Other chronic sinusitis  2. Left ear pain    Given persistence of patient's ear pain I believe it is warranted for him to be referred to ENT at this time. On physical exam there does appear to be fluid behind the left ear though it does not appear that there is any organic cause aside from chronic inflammation on imaging studies. I advised patient to utilize both a second-generation antihistamine and Flonase simultaneously and to try to get the Flonase into the pharyngotympanic tube by holding his nose and sucking in and out. Patient advised that it is unlikely has any sort of infection at this time and that antibiotics would not be useful. It is possible patient may require nasal surgery for reduction of chronic inflammation. Patient says he will sign release so that we have access to his most recent MRI    No follow-ups on file. Subjective   SUBJECTIVE/OBJECTIVE:  Pamela Santos is a 45 y.o. male who presents to ongoing ear pain and pressure. Patient says this has been going on for around 5 years however has gotten really bad over the last 3 months. Patient says she is tried numerous remedies including azithromycin, saline rinses, Flonase, Afrin, hot peppers, Claritin, and OTC oral antihistamines. Patient says he had surgery for a deviated septum several years ago. Patient says that he has been experiencing some tinnitus for the last year as well. Patient denies any ear drainage or fevers.   Patient had an MRI of his sinuses earlier this year and stated that only sinusitis was noted.            Otalgia   There is pain in the left ear. This is a recurrent problem. There has been no fever. Associated symptoms include headaches and rhinorrhea. Pertinent negatives include no abdominal pain, coughing, ear discharge or hearing loss. Review of Systems   Constitutional:  Negative for activity change and fever. HENT:  Positive for ear pain, rhinorrhea, sinus pressure and tinnitus. Negative for congestion, ear discharge and hearing loss. Eyes:  Negative for visual disturbance. Respiratory:  Negative for cough, chest tightness, shortness of breath and wheezing. Cardiovascular:  Negative for chest pain. Gastrointestinal:  Negative for abdominal pain and blood in stool. Genitourinary:  Negative for difficulty urinating and urgency. Neurological:  Positive for headaches. Negative for weakness. Psychiatric/Behavioral:  Negative for confusion. Objective   Physical Exam  Vitals reviewed. Constitutional:       General: He is not in acute distress. Appearance: Normal appearance. He is not ill-appearing. HENT:      Head: Normocephalic and atraumatic. Right Ear: Tympanic membrane, ear canal and external ear normal. There is no impacted cerumen. Left Ear: A middle ear effusion is present. Tympanic membrane is not bulging. Nose:      Right Turbinates: Enlarged. Left Turbinates: Enlarged. Right Sinus: No maxillary sinus tenderness or frontal sinus tenderness. Left Sinus: No maxillary sinus tenderness or frontal sinus tenderness. Cardiovascular:      Rate and Rhythm: Normal rate and regular rhythm. Pulses: Normal pulses. Heart sounds: Normal heart sounds. No murmur heard. Pulmonary:      Effort: Pulmonary effort is normal. No respiratory distress. Breath sounds: Normal breath sounds. No wheezing or rhonchi. Chest:      Chest wall: No tenderness. Abdominal:      General: Abdomen is flat.  Bowel sounds are normal. There is no distension. Palpations: Abdomen is soft. Tenderness: There is no abdominal tenderness. Musculoskeletal:         General: No swelling. Skin:     General: Skin is warm and dry. Neurological:      General: No focal deficit present. Mental Status: He is alert. Vitals:    10/17/22 1342   BP: 122/70   Pulse: 68   Temp: 98.1 °F (36.7 °C)   SpO2: 98%        Current Outpatient Medications   Medication Sig Dispense Refill    loratadine (CLARITIN) 10 MG capsule Take 10 mg by mouth daily      meloxicam (MOBIC) 15 MG tablet Take 15 mg by mouth daily      DULoxetine (CYMBALTA) 30 MG extended release capsule Take 30 mg by mouth daily       No current facility-administered medications for this visit. No family history on file.    Past Medical History:   Diagnosis Date    Chronic back pain     Compression of sciatic nerve     Deviated septum     Hypogonadism male     Sleep apnea     no c-pap      Past Surgical History:   Procedure Laterality Date    SINUS ENDOSCOPY N/A 10/9/2020    Functional Endoscopy sinus surgery, Septoplasty, CAUTER TURBINATE MUCOSA, bilateral INTRAMURAL THERAPEUTIC FRACTURE INFER TURBINATE with radiofrequency ablation, bilateral partial ethmoidectomy, OPEN MAXILLary antrostomy, bilateral propel mini implant performed by Renetta Arias MD at [address] [facility]        No Known Allergies     Lab Results   Component Value Date     09/30/2020    K 4.6 09/30/2020     09/30/2020    CO2 21 (L) 09/30/2020    BUN 13 09/30/2020    CREATININE 0.9 09/30/2020    GLUCOSE 86 09/30/2020    CALCIUM 9.0 09/30/2020    PROT 6.7 09/30/2020    LABALBU 4.3 09/30/2020    BILITOT <0.2 09/30/2020    ALKPHOS 65 09/30/2020    AST 18 09/30/2020    ALT 20 09/30/2020    LABGLOM >60 09/30/2020    GFRAA >59 09/30/2020        Lab Results   Component Value Date    WBC 10.4 09/30/2020    HGB 18.7 (H) 09/30/2020    HCT 53.4 (H) 09/30/2020    MCV 97.3 (H) 09/30/2020     09/30/2020 EMR Dragon/transcription disclaimer:  Much of this encounter note is electronic transcription/translation of spoken language toprinted texts. The electronic translation of spoken language may be erroneous, or at times, nonsensical words or phrases may be inadvertently transcribed. Although I have reviewed the note for such errors, some may stillexist.      An electronic signature was used to authenticate this note.     --Yas Lambert MD

## 2022-11-11 ENCOUNTER — OFFICE VISIT (OUTPATIENT)
Dept: ENT CLINIC | Age: 52
End: 2022-11-11
Payer: COMMERCIAL

## 2022-11-11 VITALS
SYSTOLIC BLOOD PRESSURE: 138 MMHG | BODY MASS INDEX: 25.73 KG/M2 | WEIGHT: 190 LBS | DIASTOLIC BLOOD PRESSURE: 86 MMHG | HEIGHT: 72 IN

## 2022-11-11 DIAGNOSIS — B37.0 ORAL THRUSH: ICD-10-CM

## 2022-11-11 DIAGNOSIS — H92.02 OTALGIA OF LEFT EAR: Primary | ICD-10-CM

## 2022-11-11 PROBLEM — J32.8 OTHER CHRONIC SINUSITIS: Status: RESOLVED | Noted: 2020-06-11 | Resolved: 2022-11-11

## 2022-11-11 PROCEDURE — 31575 DIAGNOSTIC LARYNGOSCOPY: CPT | Performed by: PHYSICIAN ASSISTANT

## 2022-11-11 PROCEDURE — 99203 OFFICE O/P NEW LOW 30 MIN: CPT | Performed by: PHYSICIAN ASSISTANT

## 2022-11-11 RX ORDER — FLUTICASONE PROPIONATE 50 MCG
1 SPRAY, SUSPENSION (ML) NASAL DAILY
COMMUNITY

## 2022-11-11 RX ORDER — FLUCONAZOLE 100 MG/1
100 TABLET ORAL DAILY
Qty: 10 TABLET | Refills: 0 | Status: SHIPPED | OUTPATIENT
Start: 2022-11-11 | End: 2022-11-21

## 2022-11-11 ASSESSMENT — ENCOUNTER SYMPTOMS
PHOTOPHOBIA: 0
EYE PAIN: 0
SINUS PAIN: 0
TROUBLE SWALLOWING: 0
FACIAL SWELLING: 0
RHINORRHEA: 0
SORE THROAT: 0
SINUS PRESSURE: 1
VOICE CHANGE: 0

## 2022-11-11 NOTE — ASSESSMENT & PLAN NOTE
Oral thrush confirmed by laryngoscopy  Plan: I will place the patient on Mycostatin swish and swallow and Diflucan. Patient was advised to call if this does not improve.

## 2022-11-11 NOTE — PROGRESS NOTES
[facility] OTOLARYNGOLOGY/ENT  Mr. Armond Hickey is a pleasant 70-year-old  male that was referred by Dr. Ehsan Pineda due to problems with persistent left ear pain and left facial pain. Patient reports that the onset began about 4 years ago. He reports of stabbing pains to the left ear and to the left facial region that is mostly noted at night. He was previously seen by Dr. Joey Ashton in 2020 and underwent sinus surgery and turbinate reduction surgery in October. Patient reports that he continued with the same symptoms despite the surgery. Patient had a recent MRI of the brain that demonstrated questionable mild sinusitis with no other abnormalities. Patient denies any hearing changes. He reports that the pain is alleviated with heat or laying against something cold or applying pressure to the mastoid region. Due to the pain worsening he is requesting evaluation. Allergies: Patient has no known allergies. Current Outpatient Medications   Medication Sig Dispense Refill    fluticasone (FLONASE) 50 MCG/ACT nasal spray 1 spray by Each Nostril route daily      nystatin (MYCOSTATIN) 518796 UNIT/ML suspension Take 5 mLs by mouth 4 times daily for 10 days Retain in mouth as long as possible and swallow 200 mL 0    fluconazole (DIFLUCAN) 100 MG tablet Take 1 tablet by mouth daily for 10 days 10 tablet 0    loratadine (CLARITIN) 10 MG capsule Take 10 mg by mouth daily      meloxicam (MOBIC) 15 MG tablet Take 15 mg by mouth daily      DULoxetine (CYMBALTA) 30 MG extended release capsule Take 30 mg by mouth daily       No current facility-administered medications for this visit.        Past Surgical History:   Procedure Laterality Date    SINUS ENDOSCOPY N/A 10/9/2020    Functional Endoscopy sinus surgery, Septoplasty, CAUTER TURBINATE MUCOSA, bilateral INTRAMURAL THERAPEUTIC FRACTURE INFER TURBINATE with radiofrequency ablation, bilateral partial ethmoidectomy, OPEN MAXILLary antrostomy, bilateral propel mini implant performed by Vidya Ni MD at [facility]         Past Medical History:   Diagnosis Date    Chronic back pain     Compression of sciatic nerve     Deviated septum     Hypogonadism male     Sleep apnea     no c-pap       History reviewed. No pertinent family history. Social History     Tobacco Use    Smoking status: Every Day     Packs/day: 1.00     Types: Cigarettes    Smokeless tobacco: Never   Substance Use Topics    Alcohol use: Yes           REVIEW OF SYSTEMS:  all other systems reviewed and are negative  Review of Systems   Constitutional:  Negative for chills and fever. HENT:  Positive for ear pain and sinus pressure. Negative for congestion, dental problem, ear discharge, facial swelling, hearing loss, postnasal drip, rhinorrhea, sinus pain, sore throat, tinnitus, trouble swallowing and voice change. Eyes:  Negative for photophobia and pain. Neurological:  Negative for dizziness and headaches. Comments:     PHYSICAL EXAM:    /86   Ht 6' (1.829 m)   Wt 190 lb (86.2 kg)   BMI 25.77 kg/m²   Body mass index is 25.77 kg/m². General Appearance: well developed  and well nourished  Head/ Face: normocephalic and atraumatic  Vocal Quality: good/ normal  Ears: Right Ear: External: external ears normal Otoscopy Ear Canal: canal clear Otoscopy TM: TM's normal and TM's mobile Left Ear: External: external ears normal Otoscopy Ear Canal: canal clear Otoscopy TM: TM's normal and TM's mobile  Hearing: grossly intact  Nose: see endoscopy report  Neck: supple and adenopathy none palpable  Thyroid: normal  Oral exam demonstrated the tongue to be midline with no abnormalities to the posterior pharynx. Patient was noted with no remarkable tenderness to percussion over the maxillary frontal sinuses. He was also noted to have no tenderness over the facial nerve region bilaterally.     Assessment & Plan:    Problem List Items Addressed This Visit Oral thrush     Oral thrush confirmed by laryngoscopy  Plan: I will place the patient on Mycostatin swish and swallow and Diflucan. Patient was advised to call if this does not improve. Otalgia of left ear - Primary     Severe left otalgia with left-sided facial pain  Plan: I have recommended a CT of the left IAC for further evaluation. I will cross compared this to his recent MRI. If the radiological studies are negative would favor his symptoms being potentially trigeminal neuralgia         Relevant Orders    CT IAC POSTERIOR FOSSA W WO CONTRAST    CT LARYNGOSCOPY FLEXIBLE DIAGNOSTIC (Completed)       Orders Placed This Encounter   Procedures    CT IAC POSTERIOR FOSSA W WO CONTRAST     Standing Status:   Future     Standing Expiration Date:   11/11/2023     Scheduling Instructions:      LMP     Order Specific Question:   STAT Creatinine as needed:     Answer:   No     Order Specific Question:   Reason for exam:     Answer: Incapacitating left ear pain and left facial pain    CT LARYNGOSCOPY FLEXIBLE DIAGNOSTIC     The nares were anesthetized with 4% lidocaine aerosol bilaterally. Each nare was individually evaluated where the patient was found to have no evidence of nasal polyps or masses. The right side was utilized for the full procedure. The posterior wall of the nasopharyngeal region was unremarkable for any masses or abnormalities. The scope was advanced to the oropharyngeal region where the patient was found to have normal swallowing with no masses. The scope was advanced to the epiglottis where the vocal cords were well visualized. The vocal cords were symmetrical and fully functional.  There was some mild erythema to the cords with no edema. The tongue was extended and demonstrated positive thrush to be present. No other findings was noted. The tonsillar region was closely evaluated as I was withdrawing the scope. No masses were noted at this level.   The patient tolerated the procedure nicely with no complications. Orders Placed This Encounter   Medications    nystatin (MYCOSTATIN) 411299 UNIT/ML suspension     Sig: Take 5 mLs by mouth 4 times daily for 10 days Retain in mouth as long as possible and swallow     Dispense:  200 mL     Refill:  0    fluconazole (DIFLUCAN) 100 MG tablet     Sig: Take 1 tablet by mouth daily for 10 days     Dispense:  10 tablet     Refill:  0         Electronically signed by Deepa Ogden PA-C on 11/11/22 at 1:21 PM CST        Please note that this chart was generated using dragon dictation software. Although every effort was made to ensure the accuracy of this automated transcription, some errors in transcription may have occurred.

## 2022-11-11 NOTE — LETTER
[address] [address] [address]  Phone: [phone]  Fax: 751 [facility], PA-C      November 11, 2022     Patient: Zenon Messina   MR Number: [id number]   YOB: 1970   Date of Visit: 11/11/2022       Dear Dr. Chiquis Webb: Thank you for referring Zenon Messina to me for evaluation/treatment. Below are the relevant portions of my assessment and plan of care. If you have questions, please do not hesitate to call me. I look forward to following Varsha Mendez along with you.     Sincerely,        CATHERINE Tello PA-C    CC providers:  Yas Lambert MD  [address]  [place]

## 2022-11-11 NOTE — ASSESSMENT & PLAN NOTE
Severe left otalgia with left-sided facial pain  Plan: I have recommended a CT of the left IAC for further evaluation. I will cross compared this to his recent MRI.   If the radiological studies are negative would favor his symptoms being potentially trigeminal neuralgia

## 2022-11-17 ENCOUNTER — HOSPITAL ENCOUNTER (OUTPATIENT)
Dept: CT IMAGING | Age: 52
Discharge: HOME | End: 2022-11-17
Payer: COMMERCIAL

## 2022-11-17 DIAGNOSIS — H92.02 OTALGIA OF LEFT EAR: ICD-10-CM

## 2022-11-17 PROCEDURE — 70482 CT ORBIT/EAR/FOSSA W/O&W/DYE: CPT

## 2022-11-17 PROCEDURE — 6360000004 HC RX CONTRAST MEDICATION: Performed by: PHYSICIAN ASSISTANT

## 2022-11-17 RX ADMIN — IOPAMIDOL 75 ML: 755 INJECTION, SOLUTION INTRAVENOUS at 09:12

## 2022-11-18 ENCOUNTER — TELEPHONE (OUTPATIENT)
Dept: ENT CLINIC | Age: 52
End: 2022-11-18

## 2022-11-18 NOTE — TELEPHONE ENCOUNTER
I reviewed the CT of the left IAC and called the patient. Overall patient was noted with no significant sinus disease or any tumors to the ear canal to explain the severity of pain that he is experiencing. Based on his symptoms and current findings, I am favoring this to be questionably trigeminal neuralgia. I advised the patient to see Dr. Solitario Fox for further evaluation and treatment. He was reminded to call if he has any questions or problems. At this point he is to follow-up with me as needed.       Electronically signed by Mika Downs PA-C on 11/18/22 at 1:21 PM CST

## 2022-12-21 ENCOUNTER — OFFICE VISIT (OUTPATIENT)
Dept: SURGERY | Facility: CLINIC | Age: 52
End: 2022-12-21

## 2022-12-21 VITALS
WEIGHT: 186 LBS | HEIGHT: 72 IN | BODY MASS INDEX: 25.19 KG/M2 | SYSTOLIC BLOOD PRESSURE: 149 MMHG | HEART RATE: 84 BPM | DIASTOLIC BLOOD PRESSURE: 84 MMHG | OXYGEN SATURATION: 95 %

## 2022-12-21 DIAGNOSIS — D48.5 NEOPLASM OF UNCERTAIN BEHAVIOR OF SKIN: Primary | ICD-10-CM

## 2022-12-21 DIAGNOSIS — L72.3 SEBACEOUS CYST: ICD-10-CM

## 2022-12-21 PROCEDURE — 99203 OFFICE O/P NEW LOW 30 MIN: CPT | Performed by: SPECIALIST

## 2022-12-21 RX ORDER — MELOXICAM 15 MG/1
TABLET ORAL
COMMUNITY
Start: 2022-12-19

## 2022-12-21 RX ORDER — DULOXETIN HYDROCHLORIDE 30 MG/1
CAPSULE, DELAYED RELEASE ORAL
COMMUNITY
Start: 2022-12-19 | End: 2023-02-23

## 2022-12-21 NOTE — PROGRESS NOTES
"Kaya Irwin MD [facility] History and Physical     Referring Provider: Noah Pulliam MD      Chief complaint   Chief Complaint   Patient presents with   • Skin Lesion        Subjective .     History of present illness:  Jose Goss is a 52 y.o. male who presents after recent dermatology visit to discuss excision of several skin neoplasias of uncertain behavior that were identified at the visit.  He is status post excision of lesion right back and abdominal wall x 2.  He also complains of two cysts, one at the left abdominal wall and the other at the right back.    History    History reviewed. No pertinent past medical history.,   Past Surgical History:   Procedure Laterality Date   • MOLE REMOVAL     • WRIST SURGERY     , History reviewed. No pertinent family history.,   Social History     Tobacco Use   • Smoking status: Every Day     Packs/day: 1.00     Years: 30.00     Pack years: 30.00     Types: Cigarettes   • Smokeless tobacco: Never   Vaping Use   • Vaping Use: Never used   Substance Use Topics   • Alcohol use: Yes   • Drug use: Defer   , (Not in a hospital admission)   and Allergies:  Patient has no known allergies.    Current Outpatient Medications:   •  DULoxetine (CYMBALTA) 30 MG capsule, duloxetine 30 mg capsule,delayed release  daily, Disp: , Rfl:   •  meloxicam (MOBIC) 15 MG tablet, meloxicam 15 mg tablet  daily, Disp: , Rfl:     Review of Systems:    All organ systems were evaluated and found negative except those which are mentioned in the History of Present Illness.    Objective     Physical Exam:    Vital Signs   /84 (BP Location: Left arm, Patient Position: Sitting, Cuff Size: Adult)   Pulse 84   Ht 182.9 cm (72\")   Wt 84.4 kg (186 lb)   SpO2 95%   BMI 25.23 kg/m²        Constitutional:    Well-developed, well-nourished in no acute distress  Eyes:     Extraocular movements intact; pupils equal, round, and reactive  Ears, Nose, Mouth, Throat:  Hearing intact, nose midline, " no mucosal lesions  Cardiovascular:   Regular rate and rhythm   Respiratory:    Clear to auscultation bilaterally  Gastrointestinal:   Soft, nontender, nondistended, no masses, bowel sounds intact  Genitourinary:    Deferred  Musculoskeletal:   Full range of motion, no muscle wasting, no weakness  Skin:     No rashes or excoriations  Neurological:    Moves all extremities, sensation intact  Psychiatric:    Alert and oriented to person, place, and time  Hematologic/Lymphatic/Immune: No lymphadenopathy        Results Review:    BMI is >= 25 and <30. (Overweight) The following options were offered after discussion;: referral to primary care    Assessment & Plan       Diagnoses and all orders for this visit:    1. Neoplasm of uncertain behavior of skin (Primary)    2. Sebaceous cyst           The patient will be scheduled for wide local excision of the lesions pending the pathology report.  We will await the findings to determine which lesions will require wide local re-excision. He will also undergo excision of the two sebaceous cysts..       The patient will be scheduled for prework testing including:  COVID; cbc, cmp, EKG, and CXR will be performed dependent upon anesthesia requirements.    An extensive review of patient intake forms, referring physician documents, laboratories, and imaging was performed in the medical decision making and surgical planning of this patient.      The risks including:  bleeding, infection, injury to surrounding structures, seroma formation, need for re-excision for wider margins, chronic pain, and recurrence were discussed as well as the benefits, complications, and possible alternatives of the above procedures and the patient agreed to proceed.      Kaya Irwin MD

## 2023-02-22 NOTE — PROGRESS NOTES
Name:  Jose Goss  YOB: 1970  Location: [facility]  Location Address: [address] [place] [address], [facility], [address] [place] [address]  Location Phone: [phone]    Chief Complaint  Chief Complaint   Patient presents with   • Skin Lesion     Left ear has a wart in it that has been there around 7 months.        History of Present Illness  The patient  is a 52 y.o. male who is referred by Danyelle Sahu MD for preoperative evaluation. He complains of a lesionleft ear present for 7 month(s)  It has not been  biopsied.    Patient states he scratches the area and it seems to go away and then shortly after he can feel it again.   He denies pain but states occasionally he has some itching to the area    He smokes 1 ppd        History reviewed. No pertinent past medical history.    Past Surgical History:   Procedure Laterality Date   • MOLE REMOVAL     • WRIST SURGERY           Current Outpatient Medications:   •  DULoxetine (CYMBALTA) 20 MG capsule, 40 mg 2 (Two) Times a Day., Disp: , Rfl:   •  Loratadine 10 MG capsule, Take 1 tablet by mouth As Needed., Disp: , Rfl:   •  meloxicam (MOBIC) 15 MG tablet, meloxicam 15 mg tablet  daily, Disp: , Rfl:     Patient has no known allergies.    History reviewed. No pertinent family history.    Social History     Socioeconomic History   • Marital status:    Tobacco Use   • Smoking status: Every Day     Packs/day: 1.00     Years: 30.00     Pack years: 30.00     Types: Cigarettes   • Smokeless tobacco: Never   Vaping Use   • Vaping Use: Never used   Substance and Sexual Activity   • Alcohol use: Yes     Comment: occasionally   • Drug use: Never   • Sexual activity: Defer       Review of Systems   Constitutional: Negative.    HENT:        Admits lesion to left ear canal          Vitals:    23 1519   BP: 141/90   Pulse: 78   Resp: 16   Temp: 98.2 °F (36.8 °C)       Objective     Physical Exam  Vitals reviewed.   Constitutional:        Appearance: Normal appearance. He is normal weight.   HENT:      Head: Normocephalic.      Right Ear: External ear normal.      Ears:        Nose: Nose normal.   Neurological:      Mental Status: He is alert.     Dr. Sahu has examined and assessed the patient and agrees with current treatment plan        Assessment & Plan   Diagnoses and all orders for this visit:    1. Verruca (Primary)    2. Tobacco use      * Surgery not found *  No orders of the defined types were placed in this encounter.    Return in 4 weeks (on 3/23/2023) for post op.     Surgical vs conservative management options discussed  Patient given options regarding in office procedure vs procedure in operating room    Patient wishes to have removed in office     Patient Instructions   Discussion of skin lesion. Discussed risks, benefits, alternatives, and possible complications of excision of the skin lesion with reconstruction utilizing local tissue rearrangement, full-thickness skin grafting, or local interpolated flaps. Risks include, but are not limited too: bleeding, infection, hematoma, recurrence, need for additional procedures, flap failure, cosmetic deformity. Patient understands risks and would like to proceed with surgery.  Alternatives include doing nothing.

## 2023-02-23 ENCOUNTER — OFFICE VISIT (OUTPATIENT)
Dept: OTOLARYNGOLOGY | Facility: CLINIC | Age: 53
End: 2023-02-23
Payer: OTHER GOVERNMENT

## 2023-02-23 VITALS
DIASTOLIC BLOOD PRESSURE: 90 MMHG | TEMPERATURE: 98.2 F | BODY MASS INDEX: 26.41 KG/M2 | WEIGHT: 195 LBS | RESPIRATION RATE: 16 BRPM | HEART RATE: 78 BPM | SYSTOLIC BLOOD PRESSURE: 141 MMHG | HEIGHT: 72 IN

## 2023-02-23 DIAGNOSIS — Z72.0 TOBACCO USE: ICD-10-CM

## 2023-02-23 DIAGNOSIS — B07.9 VERRUCA: Primary | ICD-10-CM

## 2023-02-23 PROCEDURE — 99203 OFFICE O/P NEW LOW 30 MIN: CPT | Performed by: NURSE PRACTITIONER

## 2023-02-23 RX ORDER — LORATADINE 10 MG/1
1 CAPSULE, LIQUID FILLED ORAL AS NEEDED
COMMUNITY

## 2023-02-23 RX ORDER — DULOXETIN HYDROCHLORIDE 20 MG/1
40 CAPSULE, DELAYED RELEASE ORAL 2 TIMES DAILY
COMMUNITY
Start: 2023-02-15

## 2023-04-06 ENCOUNTER — PROCEDURE VISIT (OUTPATIENT)
Dept: OTOLARYNGOLOGY | Facility: CLINIC | Age: 53
End: 2023-04-06
Payer: OTHER GOVERNMENT

## 2023-04-06 DIAGNOSIS — B07.9 VERRUCA: Primary | ICD-10-CM

## 2023-04-06 PROCEDURE — 11442 EXC FACE-MM B9+MARG 1.1-2 CM: CPT | Performed by: OTOLARYNGOLOGY

## 2023-04-06 NOTE — PATIENT INSTRUCTIONS
Call or return for problems  Wound care as instructed; clean 3 times a day with warm soapy water and apply ointment provided  Follow up in 10 days for suture removal

## 2023-04-06 NOTE — PROGRESS NOTES
PROCEDURE NOTE    Jose Goss    DATE OF PROCEDURE: 04/06/2023    PROCEDURE:   Excision of verruca of the left pinna with simple closure    PREPROCEDURE DIAGNOSIS:   Verruca of the left pinna    POSTPROCEDURE DIAGNOSIS:  SAME    ANESTHESIA:   Injected 1% Lidocaine with 1:100,000 parts epinephrine solution - 2 cc    PROCEDURE DESCRIPTION:    The patient was prepped and draped in the usual fashion.  Following the injection of local anesthesia circumferential elliptical excision was accomplished of the lesion of the left pinna without difficulty utilizing a #15 blade.  Excision was 1.0 x 0.4 cm.  The lesion was 0.3 x 0.3 cm.  The margin was 0.5 mm.  The excision extended to  subcutaneous tissue.    Reapproximation was accomplished with running locked 5-0 nylon.    Bacitracin ointment was applied and the procedure terminated.  The patient tolerated the procedure well. There were no complications.

## 2023-04-17 ENCOUNTER — OFFICE VISIT (OUTPATIENT)
Dept: OTOLARYNGOLOGY | Facility: CLINIC | Age: 53
End: 2023-04-17
Payer: OTHER GOVERNMENT

## 2023-04-17 DIAGNOSIS — B07.9 VERRUCA: Primary | ICD-10-CM

## 2023-04-17 PROCEDURE — 99024 POSTOP FOLLOW-UP VISIT: CPT | Performed by: OTOLARYNGOLOGY

## 2023-04-17 NOTE — PROGRESS NOTES
Procedure   Suture Removal    Date/Time: 4/17/2023 10:12 AM  Performed by: Sherry Lim RN  Authorized by: Berhane Sahu MD   Consent: Verbal consent obtained.  Consent given by: patient  Patient identity confirmed: verbally with patient  Body area: head/neck  Location details: left ear  Comments: Patient presents for suture removal. The incision is well-approximated with no redness or edema noted. Patient notified of path

## 2023-10-18 ENCOUNTER — TRANSCRIBE ORDERS (OUTPATIENT)
Dept: ADMINISTRATIVE | Facility: HOSPITAL | Age: 53
End: 2023-10-18
Payer: OTHER GOVERNMENT

## 2023-10-18 ENCOUNTER — LAB (OUTPATIENT)
Dept: LAB | Facility: HOSPITAL | Age: 53
End: 2023-10-18
Payer: OTHER GOVERNMENT

## 2023-10-18 DIAGNOSIS — D75.1 POLYCYTHEMIA: Primary | ICD-10-CM

## 2023-10-18 LAB
HCT VFR BLD AUTO: 55 % (ref 37.5–51)
HGB BLD-MCNC: 18.6 G/DL (ref 13–17.7)
Lab: NORMAL
POST-BLOOD PRESSURE: NORMAL
PRE-BLOOD PRESSURE: NORMAL MMHG
PRE-HCT: 55 %
PRE-HGB: 18.6 G/DL
PRE-PULSE: 64 BPM
VOLUME COLLECTED: 500 ML

## 2023-10-18 PROCEDURE — 85018 HEMOGLOBIN: CPT

## 2023-10-18 PROCEDURE — 85014 HEMATOCRIT: CPT

## 2023-10-18 PROCEDURE — 99195 PHLEBOTOMY: CPT

## 2023-10-18 PROCEDURE — 36415 COLL VENOUS BLD VENIPUNCTURE: CPT

## 2023-11-15 ENCOUNTER — LAB (OUTPATIENT)
Dept: LAB | Facility: HOSPITAL | Age: 53
End: 2023-11-15
Payer: OTHER GOVERNMENT

## 2023-11-15 DIAGNOSIS — D75.1 POLYCYTHEMIA: Primary | ICD-10-CM

## 2023-11-15 LAB
HCT VFR BLD AUTO: 54.5 % (ref 37.5–51)
HGB BLD-MCNC: 18.2 G/DL (ref 13–17.7)
Lab: NORMAL
POST-BLOOD PRESSURE: NORMAL MMHG
PRE-BLOOD PRESSURE: NORMAL MMHG
PRE-HCT: 54.5 %
PRE-HGB: 18.2 G/DL
PRE-PULSE: 76 BPM
VOLUME COLLECTED: 500 ML

## 2023-11-15 PROCEDURE — 99195 PHLEBOTOMY: CPT

## 2023-11-15 PROCEDURE — 85018 HEMOGLOBIN: CPT

## 2023-11-15 PROCEDURE — 85014 HEMATOCRIT: CPT

## 2023-11-15 PROCEDURE — 36415 COLL VENOUS BLD VENIPUNCTURE: CPT

## 2023-12-13 ENCOUNTER — TRANSCRIBE ORDERS (OUTPATIENT)
Dept: ADMINISTRATIVE | Facility: HOSPITAL | Age: 53
End: 2023-12-13
Payer: OTHER GOVERNMENT

## 2023-12-13 ENCOUNTER — LAB (OUTPATIENT)
Dept: LAB | Facility: HOSPITAL | Age: 53
End: 2023-12-13
Payer: OTHER GOVERNMENT

## 2023-12-13 DIAGNOSIS — D75.1 POLYCYTHEMIA: Primary | ICD-10-CM

## 2023-12-13 DIAGNOSIS — D75.1 POLYCYTHEMIA: ICD-10-CM

## 2023-12-13 LAB
HCT VFR BLD AUTO: 56.1 % (ref 37.5–51)
HGB BLD-MCNC: 18.5 G/DL (ref 13–17.7)
Lab: NORMAL
POST-BLOOD PRESSURE: NORMAL MMHG
PRE-BLOOD PRESSURE: NORMAL MMHG
PRE-HCT: 56.1 %
PRE-HGB: 18.5 G/DL
PRE-PULSE: 81 BPM
VOLUME COLLECTED: 500 ML

## 2023-12-13 PROCEDURE — 99195 PHLEBOTOMY: CPT

## 2023-12-13 PROCEDURE — 85014 HEMATOCRIT: CPT

## 2023-12-13 PROCEDURE — 85018 HEMOGLOBIN: CPT

## 2023-12-13 PROCEDURE — 36415 COLL VENOUS BLD VENIPUNCTURE: CPT

## 2024-05-31 ENCOUNTER — OFFICE VISIT (OUTPATIENT)
Dept: GASTROENTEROLOGY | Facility: CLINIC | Age: 54
End: 2024-05-31
Payer: OTHER GOVERNMENT

## 2024-05-31 VITALS
DIASTOLIC BLOOD PRESSURE: 92 MMHG | SYSTOLIC BLOOD PRESSURE: 168 MMHG | WEIGHT: 203 LBS | HEIGHT: 72 IN | HEART RATE: 76 BPM | OXYGEN SATURATION: 99 % | TEMPERATURE: 97.3 F | BODY MASS INDEX: 27.5 KG/M2

## 2024-05-31 DIAGNOSIS — Z86.010 HX OF COLONIC POLYP: Primary | ICD-10-CM

## 2024-05-31 PROBLEM — Z86.0100 HX OF COLONIC POLYP: Status: ACTIVE | Noted: 2024-05-31

## 2024-05-31 NOTE — H&P (VIEW-ONLY)
[facility] Gastroenterology    Primary Physician Noah Pulliam MD    5/31/2024    Jose Goss   1970      Chief Complaint   Patient presents with    Colonoscopy       Subjective     HPI    Jose Goss is a 53 y.o. male who presents as a referral for preventative maintenance. He has no complaints of nausea or vomiting. No change in bowels. No wt loss. No BRBPR. No melena. No abdominal pain.       Last colonoscopy 2019 , had colon polyps.     No family history of colon cancer/polyps.     Past Medical History:   Diagnosis Date    Hyperlipidemia     Mood disorder     Polycythemia     Sleep apnea        Past Surgical History:   Procedure Laterality Date    COLONOSCOPY      2019? polyps    MOLE REMOVAL      NASAL SEPTUM SURGERY      WRIST SURGERY         Outpatient Medications Marked as Taking for the 5/31/24 encounter (Office Visit) with Adela Sahu APRN   Medication Sig Dispense Refill    DULoxetine (CYMBALTA) 20 MG capsule 2 capsules 2 (Two) Times a Day.      meloxicam (MOBIC) 15 MG tablet meloxicam 15 mg tablet   daily      vitamin B-12 (CYANOCOBALAMIN) 1000 MCG tablet Take 1 tablet by mouth Daily.         No Known Allergies    Social History     Socioeconomic History    Marital status:    Tobacco Use    Smoking status: Every Day     Current packs/day: 1.00     Average packs/day: 1 pack/day for 30.0 years (30.0 ttl pk-yrs)     Types: Cigarettes    Smokeless tobacco: Never   Vaping Use    Vaping status: Never Used   Substance and Sexual Activity    Alcohol use: Yes     Comment: occasionally    Drug use: Never    Sexual activity: Defer       Family History   Problem Relation Age of Onset    Colon cancer Neg Hx        Review of Systems   Constitutional:  Negative for chills, fever and unexpected weight change.   Respiratory:  Negative for shortness of breath.    Cardiovascular:  Negative for chest pain.   Gastrointestinal:  Negative for abdominal distention, abdominal  pain, anal bleeding, blood in stool, constipation, diarrhea, nausea and vomiting.       Objective     Vitals:    05/31/24 0821   BP: 168/92   Pulse: 76   Temp: 97.3 °F (36.3 °C)   SpO2: 99%         05/31/24 0821   Weight: 92.1 kg (203 lb)     Body mass index is 27.53 kg/m².    Physical Exam  Vitals reviewed.   Constitutional:       General: He is not in acute distress.  Cardiovascular:      Rate and Rhythm: Normal rate and regular rhythm.      Heart sounds: Normal heart sounds.   Pulmonary:      Effort: Pulmonary effort is normal.      Breath sounds: Normal breath sounds.   Abdominal:      General: Bowel sounds are normal. There is no distension.      Palpations: Abdomen is soft.      Tenderness: There is no abdominal tenderness.   Skin:     General: Skin is warm and dry.   Neurological:      Mental Status: He is alert.         Imaging Results (Most Recent)       None            Assessment & Plan     Diagnoses and all orders for this visit:    1. Hx of colonic polyp (Primary)  -     Case Request; Standing  -     Case Request    Other orders  -     Implement Anesthesia Orders Day of Procedure; Standing  -     Obtain Informed Consent; Standing      Schedule colonoscopy. Miralax prep. Hold mobic 5 days prior to procedure.                COLONOSCOPY WITH ANESTHESIA (N/A)  All risks, benefits, alternatives, and indications of colonoscopy procedure have been discussed with the patient. Risks to include perforation of the colon requiring possible surgery or colostomy, risk of bleeding from biopsies or removal of colon tissue, possibility of missing a colon polyp or cancer, or adverse drug reaction.  Benefits to include the diagnosis and management of disease of the colon and rectum. Alternatives to include barium enema, radiographic evaluation, lab testing or no intervention. Pt verbalizes understanding and agrees.     There are no Patient Instructions on file for this visit.    CAILIN Randhawa

## 2024-05-31 NOTE — PROGRESS NOTES
[facility]    Primary Physician Noah Pulliam MD    5/31/2024    Jose Goss   1970      Chief Complaint   Patient presents with    Colonoscopy       Subjective     HPI    Jose Goss is a 53 y.o. male who presents as a referral for preventative maintenance. He has no complaints of nausea or vomiting. No change in bowels. No wt loss. No BRBPR. No melena. No abdominal pain.       Last colonoscopy 2019 , had colon polyps.     No family history of colon cancer/polyps.     Past Medical History:   Diagnosis Date    Hyperlipidemia     Mood disorder     Polycythemia     Sleep apnea        Past Surgical History:   Procedure Laterality Date    COLONOSCOPY      2019? polyps    MOLE REMOVAL      NASAL SEPTUM SURGERY      WRIST SURGERY         Outpatient Medications Marked as Taking for the 5/31/24 encounter (Office Visit) with Adela Sahu APRN   Medication Sig Dispense Refill    DULoxetine (CYMBALTA) 20 MG capsule 2 capsules 2 (Two) Times a Day.      meloxicam (MOBIC) 15 MG tablet meloxicam 15 mg tablet   daily      vitamin B-12 (CYANOCOBALAMIN) 1000 MCG tablet Take 1 tablet by mouth Daily.         No Known Allergies    Social History     Socioeconomic History    Marital status:    Tobacco Use    Smoking status: Every Day     Current packs/day: 1.00     Average packs/day: 1 pack/day for 30.0 years (30.0 ttl pk-yrs)     Types: Cigarettes    Smokeless tobacco: Never   Vaping Use    Vaping status: Never Used   Substance and Sexual Activity    Alcohol use: Yes     Comment: occasionally    Drug use: Never    Sexual activity: Defer       Family History   Problem Relation Age of Onset    Colon cancer Neg Hx        Review of Systems   Constitutional:  Negative for chills, fever and unexpected weight change.   Respiratory:  Negative for shortness of breath.    Cardiovascular:  Negative for chest pain.   Gastrointestinal:  Negative for abdominal distention, abdominal  pain, anal bleeding, blood in stool, constipation, diarrhea, nausea and vomiting.       Objective     Vitals:    05/31/24 0821   BP: 168/92   Pulse: 76   Temp: 97.3 °F (36.3 °C)   SpO2: 99%         05/31/24 0821   Weight: 92.1 kg (203 lb)     Body mass index is 27.53 kg/m².    Physical Exam  Vitals reviewed.   Constitutional:       General: He is not in acute distress.  Cardiovascular:      Rate and Rhythm: Normal rate and regular rhythm.      Heart sounds: Normal heart sounds.   Pulmonary:      Effort: Pulmonary effort is normal.      Breath sounds: Normal breath sounds.   Abdominal:      General: Bowel sounds are normal. There is no distension.      Palpations: Abdomen is soft.      Tenderness: There is no abdominal tenderness.   Skin:     General: Skin is warm and dry.   Neurological:      Mental Status: He is alert.         Imaging Results (Most Recent)       None            Assessment & Plan     Diagnoses and all orders for this visit:    1. Hx of colonic polyp (Primary)  -     Case Request; Standing  -     Case Request    Other orders  -     Implement Anesthesia Orders Day of Procedure; Standing  -     Obtain Informed Consent; Standing      Schedule colonoscopy. Miralax prep. Hold mobic 5 days prior to procedure.                COLONOSCOPY WITH ANESTHESIA (N/A)  All risks, benefits, alternatives, and indications of colonoscopy procedure have been discussed with the patient. Risks to include perforation of the colon requiring possible surgery or colostomy, risk of bleeding from biopsies or removal of colon tissue, possibility of missing a colon polyp or cancer, or adverse drug reaction.  Benefits to include the diagnosis and management of disease of the colon and rectum. Alternatives to include barium enema, radiographic evaluation, lab testing or no intervention. Pt verbalizes understanding and agrees.     There are no Patient Instructions on file for this visit.    CAILIN Randhawa

## 2024-06-19 ENCOUNTER — ANESTHESIA (OUTPATIENT)
Dept: GASTROENTEROLOGY | Facility: HOSPITAL | Age: 54
End: 2024-06-19
Payer: OTHER GOVERNMENT

## 2024-06-19 ENCOUNTER — ANESTHESIA EVENT (OUTPATIENT)
Dept: GASTROENTEROLOGY | Facility: HOSPITAL | Age: 54
End: 2024-06-19
Payer: OTHER GOVERNMENT

## 2024-06-19 ENCOUNTER — HOSPITAL ENCOUNTER (OUTPATIENT)
Facility: HOSPITAL | Age: 54
Setting detail: HOSPITAL OUTPATIENT SURGERY
Discharge: HOME | End: 2024-06-19
Attending: INTERNAL MEDICINE | Admitting: INTERNAL MEDICINE
Payer: OTHER GOVERNMENT

## 2024-06-19 ENCOUNTER — TELEPHONE (OUTPATIENT)
Dept: GASTROENTEROLOGY | Facility: CLINIC | Age: 54
End: 2024-06-19
Payer: OTHER GOVERNMENT

## 2024-06-19 VITALS
HEIGHT: 72 IN | DIASTOLIC BLOOD PRESSURE: 83 MMHG | TEMPERATURE: 97.1 F | SYSTOLIC BLOOD PRESSURE: 114 MMHG | WEIGHT: 197 LBS | RESPIRATION RATE: 16 BRPM | BODY MASS INDEX: 26.68 KG/M2 | OXYGEN SATURATION: 97 % | HEART RATE: 71 BPM

## 2024-06-19 PROCEDURE — 25810000003 SODIUM CHLORIDE 0.9 % SOLUTION: Performed by: ANESTHESIOLOGY

## 2024-06-19 PROCEDURE — 25010000002 PROPOFOL 10 MG/ML EMULSION: Performed by: NURSE ANESTHETIST, CERTIFIED REGISTERED

## 2024-06-19 RX ORDER — ONDANSETRON 2 MG/ML
4 INJECTION INTRAMUSCULAR; INTRAVENOUS ONCE AS NEEDED
Status: DISCONTINUED | OUTPATIENT
Start: 2024-06-19 | End: 2024-06-19 | Stop reason: HOSPADM

## 2024-06-19 RX ORDER — PROPOFOL 10 MG/ML
VIAL (ML) INTRAVENOUS AS NEEDED
Status: DISCONTINUED | OUTPATIENT
Start: 2024-06-19 | End: 2024-06-19 | Stop reason: SURG

## 2024-06-19 RX ORDER — SODIUM CHLORIDE 9 MG/ML
500 INJECTION, SOLUTION INTRAVENOUS CONTINUOUS PRN
Status: DISCONTINUED | OUTPATIENT
Start: 2024-06-19 | End: 2024-06-19 | Stop reason: HOSPADM

## 2024-06-19 RX ORDER — SODIUM CHLORIDE 0.9 % (FLUSH) 0.9 %
10 SYRINGE (ML) INJECTION AS NEEDED
Status: DISCONTINUED | OUTPATIENT
Start: 2024-06-19 | End: 2024-06-19 | Stop reason: HOSPADM

## 2024-06-19 RX ORDER — LIDOCAINE HYDROCHLORIDE 20 MG/ML
INJECTION, SOLUTION EPIDURAL; INFILTRATION; INTRACAUDAL; PERINEURAL AS NEEDED
Status: DISCONTINUED | OUTPATIENT
Start: 2024-06-19 | End: 2024-06-19 | Stop reason: SURG

## 2024-06-19 RX ADMIN — SODIUM CHLORIDE: 0.9 INJECTION, SOLUTION INTRAVENOUS at 09:17

## 2024-06-19 RX ADMIN — PROPOFOL 50 MG: 10 INJECTION, EMULSION INTRAVENOUS at 09:38

## 2024-06-19 RX ADMIN — PROPOFOL 50 MG: 10 INJECTION, EMULSION INTRAVENOUS at 09:32

## 2024-06-19 RX ADMIN — PROPOFOL 50 MG: 10 INJECTION, EMULSION INTRAVENOUS at 09:36

## 2024-06-19 RX ADMIN — PROPOFOL 50 MG: 10 INJECTION, EMULSION INTRAVENOUS at 09:28

## 2024-06-19 RX ADMIN — PROPOFOL 50 MG: 10 INJECTION, EMULSION INTRAVENOUS at 09:30

## 2024-06-19 RX ADMIN — PROPOFOL 100 MG: 10 INJECTION, EMULSION INTRAVENOUS at 09:23

## 2024-06-19 RX ADMIN — PROPOFOL 50 MG: 10 INJECTION, EMULSION INTRAVENOUS at 09:34

## 2024-06-19 RX ADMIN — PROPOFOL 50 MG: 10 INJECTION, EMULSION INTRAVENOUS at 09:35

## 2024-06-19 RX ADMIN — LIDOCAINE HYDROCHLORIDE 80 MG: 20 INJECTION, SOLUTION EPIDURAL; INFILTRATION; INTRACAUDAL; PERINEURAL at 09:23

## 2024-06-19 RX ADMIN — PROPOFOL 50 MG: 10 INJECTION, EMULSION INTRAVENOUS at 09:26

## 2024-06-19 NOTE — ANESTHESIA POSTPROCEDURE EVALUATION
Patient: Jose Goss    Procedure Summary       Date: 06/19/24 Room / Location: [facility] ENDOSCOPY 6 / [facility] END[facility]    Anesthesia Start: 0916 Anesthesia Stop: 0945    Procedure: COLONOSCOPY WITH ANESTHESIA Diagnosis:       Hx of colonic polyp      (Hx of colonic polyp [Z86.010])    Surgeons: Dawit Mueller MD Provider: Yusra Fitzpatrick CRNA    Anesthesia Type: MAC ASA Status: 2            Anesthesia Type: MAC    Vitals  Vitals Value Taken Time   /73 06/19/24 0956   Temp     Pulse 75 06/19/24 1001   Resp 23 06/19/24 0945   SpO2 98 % 06/19/24 1001   Vitals shown include unfiled device data.        Post Anesthesia Care and Evaluation    Patient location during evaluation: PHASE II  Patient participation: complete - patient participated  Level of consciousness: awake and alert  Pain score: 0  Pain management: adequate    Airway patency: patent  Anesthetic complications: No anesthetic complications  PONV Status: none  Cardiovascular status: acceptable  Respiratory status: acceptable  Hydration status: acceptable  No anesthesia care post op

## 2024-06-19 NOTE — ANESTHESIA PREPROCEDURE EVALUATION
Anesthesia Evaluation     Patient summary reviewed   no history of anesthetic complications:   NPO Solid Status: > 8 hours             Airway   Mallampati: II  No difficulty expected  Dental      Pulmonary    (+) a smoker,sleep apnea  Cardiovascular   Exercise tolerance: good (4-7 METS)        Neuro/Psych- negative ROS  GI/Hepatic/Renal/Endo      Musculoskeletal     Abdominal    Substance History      OB/GYN          Other        ROS/Med Hx Other: Polycythemia               Anesthesia Plan    ASA 2     MAC       Anesthetic plan, risks, benefits, and alternatives have been provided, discussed and informed consent has been obtained with: patient.    CODE STATUS:

## 2024-09-09 ENCOUNTER — OFFICE VISIT (OUTPATIENT)
Dept: PRIMARY CARE CLINIC | Age: 54
End: 2024-09-09
Payer: COMMERCIAL

## 2024-09-09 VITALS
RESPIRATION RATE: 18 BRPM | HEIGHT: 72 IN | SYSTOLIC BLOOD PRESSURE: 140 MMHG | OXYGEN SATURATION: 97 % | HEART RATE: 88 BPM | DIASTOLIC BLOOD PRESSURE: 80 MMHG | TEMPERATURE: 97.6 F | WEIGHT: 205 LBS | BODY MASS INDEX: 27.77 KG/M2

## 2024-09-09 DIAGNOSIS — Z86.79: ICD-10-CM

## 2024-09-09 DIAGNOSIS — M54.16 LUMBAR RADICULOPATHY: Primary | ICD-10-CM

## 2024-09-09 PROCEDURE — 99214 OFFICE O/P EST MOD 30 MIN: CPT | Performed by: FAMILY MEDICINE

## 2024-09-09 RX ORDER — LANOLIN ALCOHOL/MO/W.PET/CERES
1000 CREAM (GRAM) TOPICAL DAILY
COMMUNITY

## 2024-09-09 RX ORDER — ROPINIROLE 1 MG/1
1 TABLET, FILM COATED ORAL NIGHTLY
COMMUNITY
End: 2024-09-09

## 2024-09-09 RX ORDER — PREGABALIN 25 MG/1
25 CAPSULE ORAL 3 TIMES DAILY
Qty: 90 CAPSULE | Refills: 0 | Status: SHIPPED | OUTPATIENT
Start: 2024-09-09 | End: 2024-10-09

## 2024-09-09 SDOH — ECONOMIC STABILITY: FOOD INSECURITY: WITHIN THE PAST 12 MONTHS, YOU WORRIED THAT YOUR FOOD WOULD RUN OUT BEFORE YOU GOT MONEY TO BUY MORE.: NEVER TRUE

## 2024-09-09 SDOH — ECONOMIC STABILITY: INCOME INSECURITY: HOW HARD IS IT FOR YOU TO PAY FOR THE VERY BASICS LIKE FOOD, HOUSING, MEDICAL CARE, AND HEATING?: NOT HARD AT ALL

## 2024-09-09 SDOH — ECONOMIC STABILITY: FOOD INSECURITY: WITHIN THE PAST 12 MONTHS, THE FOOD YOU BOUGHT JUST DIDN'T LAST AND YOU DIDN'T HAVE MONEY TO GET MORE.: NEVER TRUE

## 2024-09-09 ASSESSMENT — PATIENT HEALTH QUESTIONNAIRE - PHQ9
SUM OF ALL RESPONSES TO PHQ QUESTIONS 1-9: 0
SUM OF ALL RESPONSES TO PHQ9 QUESTIONS 1 & 2: 0
1. LITTLE INTEREST OR PLEASURE IN DOING THINGS: NOT AT ALL
2. FEELING DOWN, DEPRESSED OR HOPELESS: NOT AT ALL
SUM OF ALL RESPONSES TO PHQ QUESTIONS 1-9: 0

## 2024-09-11 ENCOUNTER — OFFICE VISIT (OUTPATIENT)
Dept: PRIMARY CARE CLINIC | Age: 54
End: 2024-09-11
Payer: COMMERCIAL

## 2024-09-11 VITALS
HEART RATE: 88 BPM | DIASTOLIC BLOOD PRESSURE: 84 MMHG | WEIGHT: 203 LBS | HEIGHT: 72 IN | BODY MASS INDEX: 27.5 KG/M2 | RESPIRATION RATE: 16 BRPM | TEMPERATURE: 97.6 F | SYSTOLIC BLOOD PRESSURE: 160 MMHG | OXYGEN SATURATION: 98 %

## 2024-09-11 DIAGNOSIS — M54.16 LUMBAR RADICULOPATHY: Primary | ICD-10-CM

## 2024-09-11 DIAGNOSIS — R68.89 EXERCISE INTOLERANCE: ICD-10-CM

## 2024-09-11 DIAGNOSIS — R06.02 SHORTNESS OF BREATH: ICD-10-CM

## 2024-09-11 PROCEDURE — 99212 OFFICE O/P EST SF 10 MIN: CPT | Performed by: FAMILY MEDICINE

## 2024-09-11 SDOH — ECONOMIC STABILITY: FOOD INSECURITY: WITHIN THE PAST 12 MONTHS, YOU WORRIED THAT YOUR FOOD WOULD RUN OUT BEFORE YOU GOT MONEY TO BUY MORE.: NEVER TRUE

## 2024-09-11 SDOH — ECONOMIC STABILITY: FOOD INSECURITY: WITHIN THE PAST 12 MONTHS, THE FOOD YOU BOUGHT JUST DIDN'T LAST AND YOU DIDN'T HAVE MONEY TO GET MORE.: NEVER TRUE

## 2024-09-11 SDOH — ECONOMIC STABILITY: INCOME INSECURITY: HOW HARD IS IT FOR YOU TO PAY FOR THE VERY BASICS LIKE FOOD, HOUSING, MEDICAL CARE, AND HEATING?: NOT HARD AT ALL

## 2024-09-11 ASSESSMENT — PATIENT HEALTH QUESTIONNAIRE - PHQ9
SUM OF ALL RESPONSES TO PHQ QUESTIONS 1-9: 0
SUM OF ALL RESPONSES TO PHQ QUESTIONS 1-9: 0
1. LITTLE INTEREST OR PLEASURE IN DOING THINGS: NOT AT ALL
SUM OF ALL RESPONSES TO PHQ QUESTIONS 1-9: 0
SUM OF ALL RESPONSES TO PHQ QUESTIONS 1-9: 0
2. FEELING DOWN, DEPRESSED OR HOPELESS: NOT AT ALL
SUM OF ALL RESPONSES TO PHQ9 QUESTIONS 1 & 2: 0

## 2024-09-11 ASSESSMENT — ENCOUNTER SYMPTOMS
SHORTNESS OF BREATH: 1
BACK PAIN: 1
RHINORRHEA: 0
VOMITING: 0
NAUSEA: 0
ABDOMINAL PAIN: 0
CONSTIPATION: 0
COLOR CHANGE: 0
DIARRHEA: 0
COUGH: 0

## 2024-09-13 ASSESSMENT — ENCOUNTER SYMPTOMS
VOMITING: 0
DIARRHEA: 0
COLOR CHANGE: 0
COUGH: 0
NAUSEA: 0
ABDOMINAL PAIN: 0
RHINORRHEA: 0
CONSTIPATION: 0

## 2024-09-18 ENCOUNTER — PATIENT MESSAGE (OUTPATIENT)
Dept: PRIMARY CARE CLINIC | Age: 54
End: 2024-09-18

## 2024-10-07 ENCOUNTER — OFFICE VISIT (OUTPATIENT)
Dept: PRIMARY CARE CLINIC | Age: 54
End: 2024-10-07
Payer: COMMERCIAL

## 2024-10-07 VITALS
BODY MASS INDEX: 28.17 KG/M2 | HEART RATE: 84 BPM | RESPIRATION RATE: 20 BRPM | HEIGHT: 72 IN | TEMPERATURE: 96.8 F | DIASTOLIC BLOOD PRESSURE: 86 MMHG | OXYGEN SATURATION: 96 % | WEIGHT: 208 LBS | SYSTOLIC BLOOD PRESSURE: 142 MMHG

## 2024-10-07 DIAGNOSIS — M54.16 LUMBAR RADICULOPATHY: Primary | ICD-10-CM

## 2024-10-07 DIAGNOSIS — Z86.79: ICD-10-CM

## 2024-10-07 PROCEDURE — 99213 OFFICE O/P EST LOW 20 MIN: CPT | Performed by: FAMILY MEDICINE

## 2024-10-07 RX ORDER — PREGABALIN 50 MG/1
50 CAPSULE ORAL 3 TIMES DAILY
Qty: 90 CAPSULE | Refills: 0 | Status: SHIPPED | OUTPATIENT
Start: 2024-10-07 | End: 2024-11-06

## 2024-10-07 ASSESSMENT — ENCOUNTER SYMPTOMS
DIARRHEA: 0
RHINORRHEA: 0
COLOR CHANGE: 0
ABDOMINAL PAIN: 0
COUGH: 0
VOMITING: 0
CONSTIPATION: 0
NAUSEA: 0
BACK PAIN: 1

## 2024-10-07 NOTE — PROGRESS NOTES
SUBJECTIVE:    Patient ID: Minor Lewis is a 54 y.o. male.    HPI:   Patient is seen today for 4-week follow-up.  He states that he is still having trouble with his legs and radiculopathy from his legs.  He is still taking the tizanidine at night and is taking the Lyrica during the day but does not really feel like this is doing much for him at this time would like to try to possibly increase the dose.  He [facility] and [facility] are working on trying to get him in with a neurosurgeon for his back and radiculopathy.  He has not heard from an appointment yet.  He states that they are still working on this.    He is also working on a referral for cardiology due to the previous history of congestive heart failure according to him.  He states that again he is still waiting on that appointment.    Past Medical History:   Diagnosis Date    Chronic back pain     Compression of sciatic nerve     Deviated septum     Hypogonadism male     Sleep apnea     no c-pap      Current Outpatient Medications on File Prior to Visit   Medication Sig Dispense Refill    vitamin B-12 (CYANOCOBALAMIN) 1000 MCG tablet Take 1 tablet by mouth daily      tiZANidine (ZANAFLEX) 4 MG tablet Take 1 tablet by mouth nightly as needed (back pain) 30 tablet 0    meloxicam (MOBIC) 15 MG tablet Take 1 tablet by mouth daily      DULoxetine (CYMBALTA) 30 MG extended release capsule Take 1 capsule by mouth daily      [DISCONTINUED] Cholecalciferol (VITAMIN D3) 78118 UNITS CAPS Take 1 capsule by mouth once a week 16 capsule 0     No current facility-administered medications on file prior to visit.     No Known Allergies    Review of Systems   Constitutional:  Positive for fatigue. Negative for activity change and appetite change.   HENT:  Negative for congestion and rhinorrhea.    Eyes:  Negative for visual disturbance.   Respiratory:  Negative for cough.    Cardiovascular:  Negative for chest pain and palpitations.

## 2024-10-22 ENCOUNTER — LAB (OUTPATIENT)
Dept: LAB | Facility: HOSPITAL | Age: 54
End: 2024-10-22
Payer: OTHER GOVERNMENT

## 2024-10-22 ENCOUNTER — OFFICE VISIT (OUTPATIENT)
Dept: CARDIOLOGY | Facility: CLINIC | Age: 54
End: 2024-10-22
Payer: OTHER GOVERNMENT

## 2024-10-22 VITALS
HEIGHT: 72 IN | SYSTOLIC BLOOD PRESSURE: 167 MMHG | HEART RATE: 84 BPM | BODY MASS INDEX: 27.77 KG/M2 | DIASTOLIC BLOOD PRESSURE: 83 MMHG | WEIGHT: 205 LBS | OXYGEN SATURATION: 94 %

## 2024-10-22 DIAGNOSIS — D75.1 ERYTHROCYTOSIS: ICD-10-CM

## 2024-10-22 DIAGNOSIS — I10 PRIMARY HYPERTENSION: ICD-10-CM

## 2024-10-22 DIAGNOSIS — E78.2 MIXED HYPERLIPIDEMIA: ICD-10-CM

## 2024-10-22 DIAGNOSIS — Z72.0 TOBACCO USE: ICD-10-CM

## 2024-10-22 DIAGNOSIS — R55 SYNCOPE AND COLLAPSE: ICD-10-CM

## 2024-10-22 DIAGNOSIS — R06.09 DOE (DYSPNEA ON EXERTION): Primary | ICD-10-CM

## 2024-10-22 DIAGNOSIS — R06.09 DOE (DYSPNEA ON EXERTION): ICD-10-CM

## 2024-10-22 LAB — NT-PROBNP SERPL-MCNC: <36 PG/ML (ref 0–900)

## 2024-10-22 PROCEDURE — 99204 OFFICE O/P NEW MOD 45 MIN: CPT | Performed by: INTERNAL MEDICINE

## 2024-10-22 PROCEDURE — 83880 ASSAY OF NATRIURETIC PEPTIDE: CPT

## 2024-10-22 PROCEDURE — 82668 ASSAY OF ERYTHROPOIETIN: CPT

## 2024-10-22 PROCEDURE — 93000 ELECTROCARDIOGRAM COMPLETE: CPT | Performed by: INTERNAL MEDICINE

## 2024-10-22 PROCEDURE — 36415 COLL VENOUS BLD VENIPUNCTURE: CPT

## 2024-10-22 RX ORDER — PREGABALIN 50 MG/1
50 CAPSULE ORAL 3 TIMES DAILY
COMMUNITY

## 2024-10-22 NOTE — PROGRESS NOTES
"Chief Complaint  Establish Care (New Patient: Referred by: Noah Pulliam MD for high red blood count, TAVAREZ, HTN and hx of CHF in 2020.)    Subjective      Jose Goss presents to [facility] CARDIOLOGY  History of Present Illness  Jose Goss is a 54-year-old male referred for cardiology evaluation by Dr. Jeb Pulliam of the [facility].  The patient tells me that on a recent employment questionnaire he mentioned that he had had congestive heart failure several years back.  He feels that this is probably the reason he was referred for cardiology evaluation.  Please see the discussion under assessment and plan below.    In this setting, the patient has exertional dyspnea.  This is gradually worsening.  He can barely do much of anything without becoming short of breath.  The patient works for the [employer] at [employer] and there is a set of stairs there that he has a very difficult time negotiating.  He has not had any anginal type chest pain nor has he had any PND, orthopnea, palpitations, syncope or near syncope.    The patient is a cigar smoker.  He has erythrocytosis (polycythemia) and was actually sent to [facility] last fall where he had a series of 3 phlebotomies.  He tells me that he is going to be sent back for more phlebotomies in the near future.  The patient is  and works for the [employer] as noted above.      Objective   Vital Signs:  /83 (BP Location: Left arm, Patient Position: Sitting, Cuff Size: Adult)   Pulse 84   Ht 182.9 cm (72\")   Wt 93 kg (205 lb)   SpO2 94%   BMI 27.80 kg/m²   Estimated body mass index is 27.8 kg/m² as calculated from the following:    Height as of this encounter: 182.9 cm (72\").    Weight as of this encounter: 93 kg (205 lb).          Physical Exam  A 54-year-old male who is awake, alert and oriented x 3.  He is in no distress.  HEENT: " Normocephalic.  No scleral icterus.  No pharyngeal exudate.  Neck: No carotid bruits, jugular venous distention or thyromegaly.  Heart: Normal [place] impulse.  Normal S1 and S2.  No audible murmur or gallop sound.  Abdomen: No masses, tenderness or organomegaly.  Bowel sounds active.  Extremities: No pretibial edema or cyanosis.  Pedal pulses are intact.  Neurologic: No obvious focal abnormalities noted.    Result Review :              ECG 12 Lead    Date/Time: 10/22/2024 12:22 PM  Performed by: Matti Herrmann MD    Authorized by: Matti Herrmann MD  Comparison: not compared with previous ECG   Previous ECG: no previous ECG available  Rhythm: sinus rhythm  Rate: normal  Conduction: conduction normal  ST Segments: ST segments normal  T Waves: T waves normal  QRS axis: right  Other: no other findings    Clinical impression: non-specific ECG            Assessment and Plan   Diagnoses and all orders for this visit:    1. TAVAREZ (dyspnea on exertion) (Primary)  -     Erythropoietin; Future  -     Adult Stress Echo W/ Cont or Stress Agent if Necessary Per Protocol; Future  -     ECG 12 Lead  -     BNP; Future    2. Erythrocytosis  -     Erythropoietin; Future  -     Adult Stress Echo W/ Cont or Stress Agent if Necessary Per Protocol; Future  -     ECG 12 Lead  -     BNP; Future    3. Syncope and collapse    4. Mixed hyperlipidemia  -     Erythropoietin; Future  -     Adult Stress Echo W/ Cont or Stress Agent if Necessary Per Protocol; Future  -     ECG 12 Lead  -     BNP; Future    5. Primary hypertension  -     Erythropoietin; Future  -     Adult Stress Echo W/ Cont or Stress Agent if Necessary Per Protocol; Future  -     ECG 12 Lead  -     BNP; Future    6. Tobacco use  -     Adult Stress Echo W/ Cont or Stress Agent if Necessary Per Protocol; Future  -     ECG 12 Lead  -     BNP; Future    1.  Dyspnea on exertion.  This is likely to be a symptom of his erythrocytosis (polycythemia.) an anginal  "equivalent is not excluded however.  A stress echocardiogram is ordered for further cardiac risk assessment.  A BNP is ordered to exclude the possibility of congestive heart failure (very doubtful).    2.  Erythrocytosis.  (Polycythemia).  On October 8, 2024 the patient's hemoglobin was 20.1 and hematocrit was 56.2.  As far as I can tell, no workup for erythrocytosis has been initiated.  This is likely to be the cause of the patient's symptoms of dyspnea.  I have gone ahead and ordered an erythropoietin level ( EPO).  This patient needs urgent evaluation by a hematologist.  I highly recommend that a referral be made to hematology.  I certainly agree with periodic phlebotomies.  The patient also needs to quit smoking which will help.    3.  Syncope.  On September 29, 2019, the patient had a twin syncopal episode after he had consumed 8-9 beers and had worked in a garage on a car during hot weather.  The patient states that his son, who works in surgery at [facility] [employer], actually did CPR on him.  The patient went to the emergency room at [facility] where the final diagnosis was (apparently) orthostatic syncope due to volume depletion.  The patient was allowed to go home and was not admitted to the hospital.  He has had no further episodes.  This episode is what caused him to put down that he had had \"congestive heart failure\" on his employment questionnaire.  As can clearly be seen, this is most certainly not the case, though it is certainly understandable that this was the impression our patient was left with.    4.  Hyperlipidemia.  A lipid panel performed on 10/8/2024 revealed a total cholesterol of 279, HDL 43, calculated LDL of 190, triglycerides of 228.  This will need to be addressed and the patient will likely require statin therapy but he has more pressing matters at hand .  Initiation of cholesterol-lowering therapy is deferred until the erythrocytosis has been addressed successfully.  " Please see #1 and 2 above.    5.  Hypertension.  Current blood pressure is 167/83.  This will require further monitoring and possible pharmacologic treatment at some point.    6.  Tobacco use.  This is probably contributing to his polycythemia though it is unlikely that he has a pure smokers polycythemia.  He was appraised of the health risks of ongoing tobacco use including but not limited to all types of vascular disease, cancers, COPD, all of which could ultimately result in death.  He is not willing to quit at the present time.    All questions were answered to the best of my ability.  A return visit is scheduled in 2 months.  He is encouraged to contact us for any further questions or concerns.    I would like to emphasize that I believe this man needs evaluation by a Hematologist urgently.  He has significant erythrocytosis with no known etiology.  I believe tumor needs to be ruled out along with other potential causes.    As always, I appreciate the opportunity to participate in the care of your patients.  Thank you very much for referring this pleasant gentleman.        There are no Patient Instructions on file for this visit.       Follow Up   Return in about 2 months (around 12/22/2024) for Next scheduled follow up.  Patient was given instructions and counseling regarding his condition or for health maintenance advice. Please see specific information pulled into the AVS if appropriate.

## 2024-10-23 LAB — EPO SERPL-ACNC: 5.9 MIU/ML (ref 2.6–18.5)

## 2024-10-28 ENCOUNTER — HOSPITAL ENCOUNTER (OUTPATIENT)
Dept: CARDIOLOGY | Facility: HOSPITAL | Age: 54
Discharge: HOME | End: 2024-10-28
Admitting: INTERNAL MEDICINE
Payer: OTHER GOVERNMENT

## 2024-10-28 VITALS
DIASTOLIC BLOOD PRESSURE: 91 MMHG | HEART RATE: 78 BPM | HEIGHT: 72 IN | SYSTOLIC BLOOD PRESSURE: 156 MMHG | BODY MASS INDEX: 27.77 KG/M2 | WEIGHT: 205 LBS

## 2024-10-28 DIAGNOSIS — E78.2 MIXED HYPERLIPIDEMIA: ICD-10-CM

## 2024-10-28 DIAGNOSIS — D75.1 ERYTHROCYTOSIS: ICD-10-CM

## 2024-10-28 DIAGNOSIS — Z72.0 TOBACCO USE: ICD-10-CM

## 2024-10-28 DIAGNOSIS — I10 PRIMARY HYPERTENSION: ICD-10-CM

## 2024-10-28 DIAGNOSIS — R06.09 DOE (DYSPNEA ON EXERTION): ICD-10-CM

## 2024-10-28 PROCEDURE — 93352 ADMIN ECG CONTRAST AGENT: CPT | Performed by: INTERNAL MEDICINE

## 2024-10-28 PROCEDURE — 93017 CV STRESS TEST TRACING ONLY: CPT

## 2024-10-28 PROCEDURE — 25510000001 PERFLUTREN 6.52 MG/ML SUSPENSION: Performed by: INTERNAL MEDICINE

## 2024-10-28 PROCEDURE — 93018 CV STRESS TEST I&R ONLY: CPT | Performed by: INTERNAL MEDICINE

## 2024-10-28 PROCEDURE — 93350 STRESS TTE ONLY: CPT | Performed by: INTERNAL MEDICINE

## 2024-10-28 PROCEDURE — 93350 STRESS TTE ONLY: CPT

## 2024-10-28 RX ADMIN — PERFLUTREN 8.48 MG: 6.52 INJECTION, SUSPENSION INTRAVENOUS at 13:07

## 2024-10-29 LAB
BH CV STRESS BP STAGE 1: NORMAL
BH CV STRESS BP STAGE 2: NORMAL
BH CV STRESS BP STAGE 3: NORMAL
BH CV STRESS DURATION MIN STAGE 1: 3
BH CV STRESS DURATION MIN STAGE 2: 3
BH CV STRESS DURATION MIN STAGE 3: 1
BH CV STRESS DURATION SEC STAGE 1: 0
BH CV STRESS DURATION SEC STAGE 2: 0
BH CV STRESS DURATION SEC STAGE 3: 15
BH CV STRESS ECHO POST STRESS EJECTION FRACTION EF: 80 %
BH CV STRESS GRADE STAGE 1: 10
BH CV STRESS GRADE STAGE 2: 12
BH CV STRESS GRADE STAGE 3: 14
BH CV STRESS HR STAGE 1: 112
BH CV STRESS HR STAGE 2: 129
BH CV STRESS HR STAGE 3: 142
BH CV STRESS METS STAGE 1: 5
BH CV STRESS METS STAGE 2: 7.5
BH CV STRESS METS STAGE 3: 10
BH CV STRESS PROTOCOL 1: NORMAL
BH CV STRESS RECOVERY BP: NORMAL MMHG
BH CV STRESS RECOVERY HR: 90 BPM
BH CV STRESS SPEED STAGE 1: 1.7
BH CV STRESS SPEED STAGE 2: 2.5
BH CV STRESS SPEED STAGE 3: 3.4
BH CV STRESS STAGE 1: 1
BH CV STRESS STAGE 2: 2
BH CV STRESS STAGE 3: 3
MAXIMAL PREDICTED HEART RATE: 164 BPM
PERCENT MAX PREDICTED HR: 86.59 %
STRESS BASELINE BP: NORMAL MMHG
STRESS BASELINE HR: 78 BPM
STRESS PERCENT HR: 102 %
STRESS POST ESTIMATED WORKLOAD: 10 METS
STRESS POST EXERCISE DUR MIN: 7 MIN
STRESS POST EXERCISE DUR SEC: 15 SEC
STRESS POST PEAK BP: NORMAL MMHG
STRESS POST PEAK HR: 142 BPM
STRESS TARGET HR: 139 BPM

## 2024-10-31 DIAGNOSIS — D75.1 ERYTHROCYTOSIS: Primary | ICD-10-CM

## 2024-11-04 ENCOUNTER — LAB (OUTPATIENT)
Dept: LAB | Facility: HOSPITAL | Age: 54
End: 2024-11-04

## 2024-11-04 ENCOUNTER — TRANSCRIBE ORDERS (OUTPATIENT)
Dept: ADMINISTRATIVE | Facility: HOSPITAL | Age: 54
End: 2024-11-04
Payer: OTHER GOVERNMENT

## 2024-11-04 DIAGNOSIS — D45 POLYCYTHEMIA VERA: ICD-10-CM

## 2024-11-04 DIAGNOSIS — D45 POLYCYTHEMIA VERA: Primary | ICD-10-CM

## 2024-11-04 LAB
HCT VFR BLD AUTO: 56.5 % (ref 37.5–51)
HGB BLD-MCNC: 19.8 G/DL (ref 13–17.7)

## 2024-11-04 PROCEDURE — 36415 COLL VENOUS BLD VENIPUNCTURE: CPT

## 2024-11-04 PROCEDURE — 99195 PHLEBOTOMY: CPT

## 2024-11-04 PROCEDURE — 85014 HEMATOCRIT: CPT

## 2024-11-04 PROCEDURE — 85018 HEMOGLOBIN: CPT

## 2024-11-05 LAB
Lab: NORMAL
POST-BLOOD PRESSURE: NORMAL MMHG
PRE-BLOOD PRESSURE: NORMAL MMHG
PRE-HCT: 56.5 %
PRE-HGB: 19.8 G/DL
PRE-PULSE: 72 BPM
VOLUME COLLECTED: 500 ML

## 2024-12-11 NOTE — PROGRESS NOTES
[facility]   Hematology/Oncology  Consult Note    Patient Name: Jose Goss  : 1970  MRN: [id number]  Primary Care Physician:  Noah Pulliam MD  Referring Physician: Matti Herrmann*  Date of admission: (Not on file)    Televisit    Subjective   Subjective     Reason for Consult/ Chief Complaint: Erythrocytosis    HPI:  Jose Goss is a 54 y.o. male  with a past medical history of CHF, hyperlipidemia, hypertension, sleep apnea, who presents for evaluation discussion about erythrocytosis.      It is of note that he underwent erythropoietin level checked before which was 5.9; he also underwent therapeutic phlebotomies before.    Reviewing the patient's labs available to me since 2019, seems that he always high degree of erythrocytosis, with hemoglobin ranging 17-19, and hematocrit ranging 51-56.  WBC and platelets are WNL.    Review of Systems  Constitutional:  negative  Eyes: negative  Ears, nose, mouth, throat, and face: negative  Respiratory: negative  Cardiovascular: negative  Gastrointestinal: negative  Genitourinary: negative  Integument/breast: negative  Hematologic/lymphatic: negative  Musculoskeletal: negative  Neurological: negative  Behavioral/Psych: negative  Endocrine: negative  Allergic/Immunologic: negative    Personal History     Past Medical History:   Diagnosis Date    Asthma 2019    CHF (congestive heart failure)     Hyperlipidemia     Mood disorder     Polycythemia     Sleep apnea      Past Surgical History:   Procedure Laterality Date    COLONOSCOPY      ? polyps    COLONOSCOPY N/A 2024    Procedure: COLONOSCOPY WITH ANESTHESIA;  Surgeon: Dawit Mueller MD;  Location: [facility] ENDOSCOPY;  Service: Gastroenterology;  Laterality: N/A;  preop; hx of polyp   postop diverticulosis   PCP Noah Pulliam    MOLE REMOVAL      NASAL SEPTUM SURGERY      WRIST SURGERY       Family History: family history is not on file. Otherwise pertinent FHx was  reviewed and not pertinent to current issue.    Social History:  reports that he has been smoking cigars. He has been exposed to tobacco smoke. He has never used smokeless tobacco. He reports current alcohol use of about 6.0 standard drinks of alcohol per week. He reports that he does not use drugs.    Home Medications:  DULoxetine, meloxicam, pregabalin, and vitamin B-12    Allergies:  No Known Allergies    Objective    Objective     Vitals:   Temp:  [97.8 °F (36.6 °C)] 97.8 °F (36.6 °C)  Heart Rate:  [93] 93  Resp:  [18] 18  BP: (146)/(78) 146/78    Physical Exam:  General Appearance: Alert, cooperative, no distress, appears stated age  Head:  Normocephalic, without obvious abnormality, atraumatic  Eyes: Conjunctiva/corneas clear   Neck: Supple, symmetrical  Skin: Skin color normal    Result Review:  Lab Results   Component Value Date    WBC 7.76 12/13/2024    HGB 18.4 (H) 12/13/2024    HCT 53.3 (H) 12/13/2024    MCV 94.3 12/13/2024     12/13/2024     Assessment & Plan   Assessment / Plan     Brief Patient Summary:  Jose Goss is a 54 y.o. male with a past medical history of CHF, hyperlipidemia, hypertension, sleep apnea, who presents for evaluation discussion about erythrocytosis.     I discussed with the patient the implications of erythrocytosis and the differential diagnosis.  I discussed with him that this could be either primary erythrocytosis, or secondary; primary meaning a disease in the bone marrow causing the red blood cells to be elevated, in which case she would need closer monitoring, or it could be secondary due to nutritional deficiencies or ongoing inflammation.     Primary hematological disease is unlikely in this scenario with no obvious progressive increase in the hemoglobin, absence of involvement of other cell lineages, and absence of splenomegaly and an otherwise unremarkable blood smear. Nonetheless, certain chronic myeloproliferative diseases such as chronic myelogenous  leukemia and myelofibrosis etc. can present with a high hemoglobin. I have advised that we assess her peripheral blood for JAK2 mutation and a PCR test for BCR/ABL to rule these out.    Plan:   - I will obtain JAK2 testing with reflex to CALR and MPL to rule out polycythemia vera. Will also obtain PCR for BCR/ABL to rule out CML.  - Obtain iron studies, vitamin B12 and folic acid; obtain peripheral blood smear.  - If the above workup is unrevealing, that I would recommend against further testing for now.   - If she develops new findings, such as thrombocytopenia or neutropenia or hemoglobin steadily continues to increase without any obvious reactive cause to explain it, then we can revisit additional workup, such as a bone marrow evaluation. He was is in agreement with this plan.  - I suspect the patient's erythrocytosis is likely secondary rather than primary, based on the erythropoietin level being within normal range rather than being suppressed; I will plan to repeat erythropoietin level today, and we will await the above-mentioned labs.  - I will review blood work done today when it becomes available and will contact him as needed. Otherwise, I will not burden Mr. Goss with scheduled follow up in our office, but I would be happy to see her back for any additional questions or concerns.     Dionicio Morris MD  12/13/2024  08:25 CST

## 2024-12-13 ENCOUNTER — LAB (OUTPATIENT)
Dept: LAB | Facility: HOSPITAL | Age: 54
End: 2024-12-13
Payer: OTHER GOVERNMENT

## 2024-12-13 ENCOUNTER — CONSULT (OUTPATIENT)
Dept: ONCOLOGY | Facility: CLINIC | Age: 54
End: 2024-12-13
Payer: OTHER GOVERNMENT

## 2024-12-13 VITALS
TEMPERATURE: 97.8 F | WEIGHT: 201 LBS | OXYGEN SATURATION: 97 % | DIASTOLIC BLOOD PRESSURE: 78 MMHG | RESPIRATION RATE: 18 BRPM | SYSTOLIC BLOOD PRESSURE: 146 MMHG | BODY MASS INDEX: 27.22 KG/M2 | HEIGHT: 72 IN | HEART RATE: 93 BPM

## 2024-12-13 DIAGNOSIS — D75.1 ERYTHROCYTOSIS: ICD-10-CM

## 2024-12-13 DIAGNOSIS — D75.1 ERYTHROCYTOSIS: Primary | ICD-10-CM

## 2024-12-13 LAB
ALBUMIN SERPL-MCNC: 4 G/DL (ref 3.5–5.2)
ALBUMIN/GLOB SERPL: 1.4 G/DL
ALP SERPL-CCNC: 73 U/L (ref 39–117)
ALT SERPL W P-5'-P-CCNC: 20 U/L (ref 1–41)
ANION GAP SERPL CALCULATED.3IONS-SCNC: 10 MMOL/L (ref 5–15)
AST SERPL-CCNC: 19 U/L (ref 1–40)
BASOPHILS # BLD AUTO: 0.05 10*3/MM3 (ref 0–0.2)
BASOPHILS NFR BLD AUTO: 0.6 % (ref 0–1.5)
BILIRUB SERPL-MCNC: 0.3 MG/DL (ref 0–1.2)
BUN SERPL-MCNC: 15 MG/DL (ref 6–20)
BUN/CREAT SERPL: 14.7 (ref 7–25)
CALCIUM SPEC-SCNC: 9 MG/DL (ref 8.6–10.5)
CHLORIDE SERPL-SCNC: 102 MMOL/L (ref 98–107)
CO2 SERPL-SCNC: 23 MMOL/L (ref 22–29)
CREAT SERPL-MCNC: 1.02 MG/DL (ref 0.76–1.27)
CYTOLOGIST CVX/VAG CYTO: NORMAL
DEPRECATED RDW RBC AUTO: 44.6 FL (ref 37–54)
EGFRCR SERPLBLD CKD-EPI 2021: 86.3 ML/MIN/1.73
EOSINOPHIL # BLD AUTO: 0.26 10*3/MM3 (ref 0–0.4)
EOSINOPHIL NFR BLD AUTO: 3.4 % (ref 0.3–6.2)
ERYTHROCYTE [DISTWIDTH] IN BLOOD BY AUTOMATED COUNT: 12.8 % (ref 12.3–15.4)
FERRITIN SERPL-MCNC: 49.82 NG/ML (ref 30–400)
FOLATE SERPL-MCNC: 5.81 NG/ML (ref 4.78–24.2)
GLOBULIN UR ELPH-MCNC: 2.8 GM/DL
GLUCOSE SERPL-MCNC: 74 MG/DL (ref 65–99)
HCT VFR BLD AUTO: 53.3 % (ref 37.5–51)
HGB BLD-MCNC: 18.4 G/DL (ref 13–17.7)
IMM GRANULOCYTES # BLD AUTO: 0.04 10*3/MM3 (ref 0–0.05)
IMM GRANULOCYTES NFR BLD AUTO: 0.5 % (ref 0–0.5)
IRON 24H UR-MRATE: 79 MCG/DL (ref 59–158)
IRON SATN MFR SERPL: 17 % (ref 20–50)
LYMPHOCYTES # BLD AUTO: 1.31 10*3/MM3 (ref 0.7–3.1)
LYMPHOCYTES NFR BLD AUTO: 16.9 % (ref 19.6–45.3)
MCH RBC QN AUTO: 32.6 PG (ref 26.6–33)
MCHC RBC AUTO-ENTMCNC: 34.5 G/DL (ref 31.5–35.7)
MCV RBC AUTO: 94.3 FL (ref 79–97)
MONOCYTES # BLD AUTO: 1.54 10*3/MM3 (ref 0.1–0.9)
MONOCYTES NFR BLD AUTO: 19.8 % (ref 5–12)
NEUTROPHILS NFR BLD AUTO: 4.56 10*3/MM3 (ref 1.7–7)
NEUTROPHILS NFR BLD AUTO: 58.8 % (ref 42.7–76)
NRBC BLD AUTO-RTO: 0 /100 WBC (ref 0–0.2)
PATH INTERP BLD-IMP: NORMAL
PLATELET # BLD AUTO: 181 10*3/MM3 (ref 140–450)
PMV BLD AUTO: 9.7 FL (ref 6–12)
POTASSIUM SERPL-SCNC: 4.4 MMOL/L (ref 3.5–5.2)
PROT SERPL-MCNC: 6.8 G/DL (ref 6–8.5)
RBC # BLD AUTO: 5.65 10*6/MM3 (ref 4.14–5.8)
SODIUM SERPL-SCNC: 135 MMOL/L (ref 136–145)
TIBC SERPL-MCNC: 457 MCG/DL (ref 298–536)
TRANSFERRIN SERPL-MCNC: 307 MG/DL (ref 200–360)
VIT B12 BLD-MCNC: 561 PG/ML (ref 211–946)
WBC NRBC COR # BLD AUTO: 7.76 10*3/MM3 (ref 3.4–10.8)

## 2024-12-13 PROCEDURE — 36415 COLL VENOUS BLD VENIPUNCTURE: CPT

## 2024-12-13 PROCEDURE — 85060 BLOOD SMEAR INTERPRETATION: CPT

## 2024-12-13 PROCEDURE — 81270 JAK2 GENE: CPT

## 2024-12-13 PROCEDURE — 81219 CALR GENE COM VARIANTS: CPT

## 2024-12-13 PROCEDURE — 82668 ASSAY OF ERYTHROPOIETIN: CPT

## 2024-12-13 PROCEDURE — 82746 ASSAY OF FOLIC ACID SERUM: CPT

## 2024-12-13 PROCEDURE — 81338 MPL GENE COMMON VARIANTS: CPT

## 2024-12-13 PROCEDURE — 81207 BCR/ABL1 GENE MINOR BP: CPT

## 2024-12-13 PROCEDURE — 80053 COMPREHEN METABOLIC PANEL: CPT

## 2024-12-13 PROCEDURE — 81206 BCR/ABL1 GENE MAJOR BP: CPT

## 2024-12-13 PROCEDURE — 81279 JAK2 GENE TRGT SEQUENCE ALYS: CPT

## 2024-12-13 PROCEDURE — 85025 COMPLETE CBC W/AUTO DIFF WBC: CPT

## 2024-12-13 PROCEDURE — 99204 OFFICE O/P NEW MOD 45 MIN: CPT | Performed by: INTERNAL MEDICINE

## 2024-12-13 PROCEDURE — 83540 ASSAY OF IRON: CPT

## 2024-12-13 PROCEDURE — 82728 ASSAY OF FERRITIN: CPT

## 2024-12-13 PROCEDURE — 82607 VITAMIN B-12: CPT

## 2024-12-13 PROCEDURE — 84466 ASSAY OF TRANSFERRIN: CPT

## 2024-12-16 LAB — EPO SERPL-ACNC: 7.2 MIU/ML (ref 2.6–18.5)

## 2024-12-19 ENCOUNTER — OFFICE VISIT (OUTPATIENT)
Dept: PRIMARY CARE CLINIC | Age: 54
End: 2024-12-19

## 2024-12-19 VITALS
HEART RATE: 93 BPM | RESPIRATION RATE: 16 BRPM | BODY MASS INDEX: 27.36 KG/M2 | HEIGHT: 72 IN | DIASTOLIC BLOOD PRESSURE: 84 MMHG | OXYGEN SATURATION: 96 % | TEMPERATURE: 98 F | WEIGHT: 202 LBS | SYSTOLIC BLOOD PRESSURE: 144 MMHG

## 2024-12-19 DIAGNOSIS — J40 BRONCHITIS: ICD-10-CM

## 2024-12-19 DIAGNOSIS — Z79.899 MEDICATION MANAGEMENT: Primary | ICD-10-CM

## 2024-12-19 LAB
CITATION REF LAB TEST: NORMAL
INTERPRETATION: NEGATIVE
JAK2 P.V617F BLD/T QL: NORMAL
LAB DIRECTOR NAME PROVIDER: NORMAL
LAB DIRECTOR NAME PROVIDER: NORMAL
LABORATORY COMMENT REPORT: NORMAL
LABORATORY COMMENT REPORT: NORMAL
REF LAB TEST METHOD: NORMAL
REF LAB TEST METHOD: NORMAL
T(ABL1,BCR)B2A2/CONTROL BLD/T: NORMAL %
T(ABL1,BCR)B3A2/CONTROL BLD/T: NORMAL %
T(ABL1,BCR)E1A2/CONTROL BLD/T: NORMAL %

## 2024-12-19 RX ORDER — AZITHROMYCIN 250 MG/1
TABLET, FILM COATED ORAL
Qty: 6 TABLET | Refills: 0 | Status: SHIPPED | OUTPATIENT
Start: 2024-12-19 | End: 2024-12-29

## 2024-12-19 RX ORDER — GUAIFENESIN 600 MG/1
600 TABLET, EXTENDED RELEASE ORAL 2 TIMES DAILY
Qty: 30 TABLET | Refills: 0 | Status: SHIPPED | OUTPATIENT
Start: 2024-12-19 | End: 2025-01-03

## 2024-12-19 RX ORDER — ALBUTEROL SULFATE 90 UG/1
2 INHALANT RESPIRATORY (INHALATION) 4 TIMES DAILY PRN
Qty: 18 G | Refills: 0 | Status: SHIPPED | OUTPATIENT
Start: 2024-12-19

## 2024-12-19 RX ORDER — BETAMETHASONE SODIUM PHOSPHATE AND BETAMETHASONE ACETATE 3; 3 MG/ML; MG/ML
12 INJECTION, SUSPENSION INTRA-ARTICULAR; INTRALESIONAL; INTRAMUSCULAR; SOFT TISSUE ONCE
Status: COMPLETED | OUTPATIENT
Start: 2024-12-19 | End: 2024-12-19

## 2024-12-19 RX ORDER — PREDNISONE 20 MG/1
20 TABLET ORAL 2 TIMES DAILY
Qty: 10 TABLET | Refills: 0 | Status: SHIPPED | OUTPATIENT
Start: 2024-12-19 | End: 2024-12-24

## 2024-12-19 RX ADMIN — BETAMETHASONE SODIUM PHOSPHATE AND BETAMETHASONE ACETATE 12 MG: 3; 3 INJECTION, SUSPENSION INTRA-ARTICULAR; INTRALESIONAL; INTRAMUSCULAR; SOFT TISSUE at 15:06

## 2024-12-19 ASSESSMENT — ENCOUNTER SYMPTOMS
VOMITING: 0
ABDOMINAL PAIN: 0
RHINORRHEA: 0
NAUSEA: 0
DIARRHEA: 0
SHORTNESS OF BREATH: 1
WHEEZING: 1
CHEST TIGHTNESS: 1
COLOR CHANGE: 0
CONSTIPATION: 0
COUGH: 1

## 2024-12-19 NOTE — PROGRESS NOTES
SUBJECTIVE:    Patient ID: Minor Lewis is a 54 y.o. male.    HPI:   Patient is seen today for complaints of cough congestion wheezing shortness of breath and chest tightness.  He states that the symptoms been going on for a few days and he just cannot get any relief especially with over-the-counter medications.  He states that he has not run fever.  He denies any known sick exposure.  He denies any vomiting or diarrhea.  He has not had a sore throat or ear pain.  He states that he just wants to make sure that he does not have something that he takes home to his wife before going home for [place]    Past Medical History:   Diagnosis Date    Chronic back pain     Compression of sciatic nerve     Deviated septum     Hypogonadism male     Sleep apnea     no c-pap      Current Outpatient Medications on File Prior to Visit   Medication Sig Dispense Refill    meloxicam (MOBIC) 15 MG tablet Take 1 tablet by mouth daily      DULoxetine (CYMBALTA) 30 MG extended release capsule Take 1 capsule by mouth daily      pregabalin (LYRICA) 50 MG capsule Take 1 capsule by mouth 3 times daily for 30 days. Max Daily Amount: 150 mg (Patient not taking: Reported on 12/19/2024) 90 capsule 0    vitamin B-12 (CYANOCOBALAMIN) 1000 MCG tablet Take 1 tablet by mouth daily (Patient not taking: Reported on 12/19/2024)      tiZANidine (ZANAFLEX) 4 MG tablet Take 1 tablet by mouth nightly as needed (back pain) (Patient not taking: Reported on 12/19/2024) 30 tablet 0    [DISCONTINUED] Cholecalciferol (VITAMIN D3) 14666 UNITS CAPS Take 1 capsule by mouth once a week 16 capsule 0     No current facility-administered medications on file prior to visit.     No Known Allergies    Review of Systems   Constitutional:  Negative for activity change, appetite change and fatigue.   HENT:  Positive for congestion. Negative for rhinorrhea.    Eyes:  Negative for visual disturbance.   Respiratory:  Positive for cough, chest tightness, shortness of

## 2024-12-20 LAB
CALR EXON 9 MUT ANL BLD/T: NORMAL
CITATION REF LAB TEST: NORMAL
JAK2 GENE MUT ANL BLD/T: NORMAL
LAB DIRECTOR NAME PROVIDER: NORMAL
MPL GENE MUT TESTED MAR: NORMAL
REF LAB TEST METHOD: NORMAL
SPECIMEN TYPE: NORMAL
TEST PERFORMANCE INFO SPEC: NORMAL

## 2024-12-23 ENCOUNTER — TELEPHONE (OUTPATIENT)
Dept: ONCOLOGY | Facility: CLINIC | Age: 54
End: 2024-12-23
Payer: OTHER GOVERNMENT

## 2024-12-23 NOTE — PROGRESS NOTES
Hi. I saw the patient for elevated red blood cells which the results of the labs came back unremarkable, including no signs of underlying blood disease.  Moving forward, no further workup is needed, and he could donate blood once or twice a year to lower his red cells, but it is not necessary.  Thank you

## 2024-12-23 NOTE — TELEPHONE ENCOUNTER
----- Message from Dionicio Morris sent at 12/23/2024 12:05 PM CST -----  Hi. I saw the patient for elevated red blood cells which the results of the labs came back unremarkable, including no signs of underlying blood disease.  Moving forward, no further workup is needed, and he could donate blood once or twice a year to l  ower his red cells, but it is not necessary.  Thank you

## 2024-12-27 ENCOUNTER — OFFICE VISIT (OUTPATIENT)
Dept: CARDIOLOGY | Facility: CLINIC | Age: 54
End: 2024-12-27
Payer: OTHER GOVERNMENT

## 2024-12-27 VITALS
HEIGHT: 72 IN | OXYGEN SATURATION: 98 % | DIASTOLIC BLOOD PRESSURE: 89 MMHG | HEART RATE: 78 BPM | SYSTOLIC BLOOD PRESSURE: 143 MMHG | BODY MASS INDEX: 26.55 KG/M2 | WEIGHT: 196 LBS

## 2024-12-27 DIAGNOSIS — R06.09 DOE (DYSPNEA ON EXERTION): Primary | ICD-10-CM

## 2024-12-27 DIAGNOSIS — Z72.0 TOBACCO USE: ICD-10-CM

## 2024-12-27 DIAGNOSIS — I10 PRIMARY HYPERTENSION: ICD-10-CM

## 2024-12-27 DIAGNOSIS — R55 SYNCOPE AND COLLAPSE: ICD-10-CM

## 2024-12-27 DIAGNOSIS — E78.2 MIXED HYPERLIPIDEMIA: ICD-10-CM

## 2024-12-27 PROCEDURE — 99214 OFFICE O/P EST MOD 30 MIN: CPT | Performed by: INTERNAL MEDICINE

## 2024-12-27 NOTE — PROGRESS NOTES
"Chief Complaint  TAVAREZ (dyspnea on exertion) (2 mo fu) and Results (Discuss stress echo from 10/28/24)    Subjective      Jose Goss presents to [facility]  History of Present Illness  Jose is doing well from a cardiac standpoint.  He has had no further syncopal episodes.  His stress echocardiogram on 10/22/2024 was low risk for myocardial ischemia.  Left ventricular systolic function appeared normal at rest.  He continues to have intermittent headaches that are difficult to prevent.  On one occasion he had a severe headache and an extremely high blood pressure of over 170 systolic.  The diastolic blood pressure was recorded by his device as 155 during that time.  I am not certain as to the complete accuracy of that reading however.  It would be rare to have a systolic blood pressure in the 170s and a diastolic that high.  At any rate, his blood pressure is usually only elevated when he has a headache.    He continues to work at [employer].  He and his wife have purchased some land near [facility].  He is looking forward to being able to spend more time there.  He does continue to smoke.    He saw hematologist at [facility], Dr. Dionicio Morris on 12/13/2024.  I refer the reader to Dr. Morris's note.  The patient was not given any return appointment to hematology.      Objective   Vital Signs:  /89 (BP Location: Left arm, Patient Position: Sitting, Cuff Size: Adult)   Pulse 78   Ht 182.9 cm (72.01\")   Wt 88.9 kg (196 lb)   SpO2 98%   BMI 26.58 kg/m²   Estimated body mass index is 26.58 kg/m² as calculated from the following:    Height as of this encounter: 182.9 cm (72.01\").    Weight as of this encounter: 88.9 kg (196 lb).          Physical Exam    This is a 54-year-old male in no apparent distress.  He is accompanied by his wife.  HEENT: No scleral icterus.  He is normocephalic.  Neck: No jugular venous distention.  Lungs: Normal " respiratory effort.  Lungs clear to auscultation.  Heart: Regular rhythm with normal S1 and S2 and no audible murmurs or gallops.  Extremities: No pretibial edema or cyanosis.  Neurological no obvious focal abnormalities noted.    Result Review :                   Assessment and Plan   Diagnoses and all orders for this visit:    1. TAVAREZ (dyspnea on exertion) (Primary)    2. Syncope and collapse    3. Mixed hyperlipidemia    4. Primary hypertension    5. Tobacco use    1.  Dyspnea on exertion.  This was not an anginal equivalent as the patient's stress echocardiogram was low risk.  It was not congestive heart failure, as the patient had a normal BNP of less than 36 on 10/22/2024.  Left ventricular systolic function was normal at rest before the stress echocardiogram.  The dyspnea has improved.  In all likelihood it is related to his tobacco use.    2.  Syncope and collapse.  He has had no further episodes.  As I noted previously, the impression out of the emergency room was profound orthostasis due to volume depletion.    3.  Hyperlipidemia.  He has a 10-year cardiovascular risk of just over 22% using the 2013 AHA/ACC calculator.  He would benefit from statin therapy.  He has cardiovascular risk would be reduced to around 15% or thereabouts.  The patient is returning here as needed, so this will need to be addressed by primary care.    4.  Hypertension.  The patient's blood pressure is usually not elevated and only becomes elevated when he gets a headache.  He feels that the headache starts the process rather than the other way around.  I believe that he and his wife are going to obtain blood pressure cuff and keep following his blood pressure.  At some point, antihypertensive therapy may become indicated.    5.  Tobacco use.  He was counseled once again regarding the health risks of ongoing tobacco use including but not limited to all types of vascular disease, cancers including lung cancer and COPD.  All of these  could ultimately result in death.    All questions were answered to the best of my ability.  A return visit is not scheduled at this point but I would be more than happy to see him in the future if the need should arise.  Again, it would be wise for him to quit smoking and in all likelihood beneficial if he would start statin therapy and at some point he may require antihypertensive therapy.    As previously discussed.  I find no evidence that the patient has or ever had congestive heart failure.    As always, I appreciate the opportunity to assist in the care of your patients.        There are no Patient Instructions on file for this visit.       Follow Up   Return if symptoms worsen or fail to improve, for Next scheduled follow up.  Patient was given instructions and counseling regarding his condition or for health maintenance advice. Please see specific information pulled into the AVS if appropriate.

## 2025-01-03 ENCOUNTER — COMMUNITY OUTREACH (OUTPATIENT)
Dept: PRIMARY CARE CLINIC | Age: 55
End: 2025-01-03

## 2025-02-10 ENCOUNTER — TELEPHONE (OUTPATIENT)
Dept: ONCOLOGY | Facility: CLINIC | Age: 55
End: 2025-02-10

## 2025-02-10 NOTE — TELEPHONE ENCOUNTER
Caller: ROSY    Relationship: [facility]    Best call back number: [phone]     Who are you requesting to speak with (clinical staff, provider,  specific staff member): CLINICAL    What was the call regarding: QUINCY IS CALLING TO GET LAST OFFICE NOTE FAXED    FAX # [phone]

## 2025-03-26 ASSESSMENT — PATIENT HEALTH QUESTIONNAIRE - PHQ9
SUM OF ALL RESPONSES TO PHQ9 QUESTIONS 1 & 2: 2
SUM OF ALL RESPONSES TO PHQ QUESTIONS 1-9: 2
1. LITTLE INTEREST OR PLEASURE IN DOING THINGS: SEVERAL DAYS
SUM OF ALL RESPONSES TO PHQ QUESTIONS 1-9: 2
SUM OF ALL RESPONSES TO PHQ QUESTIONS 1-9: 2
1. LITTLE INTEREST OR PLEASURE IN DOING THINGS: SEVERAL DAYS
SUM OF ALL RESPONSES TO PHQ QUESTIONS 1-9: 2
2. FEELING DOWN, DEPRESSED OR HOPELESS: SEVERAL DAYS
2. FEELING DOWN, DEPRESSED OR HOPELESS: SEVERAL DAYS

## 2025-03-28 SDOH — ECONOMIC STABILITY: INCOME INSECURITY: IN THE LAST 12 MONTHS, WAS THERE A TIME WHEN YOU WERE NOT ABLE TO PAY THE MORTGAGE OR RENT ON TIME?: NO

## 2025-03-28 SDOH — ECONOMIC STABILITY: FOOD INSECURITY: WITHIN THE PAST 12 MONTHS, YOU WORRIED THAT YOUR FOOD WOULD RUN OUT BEFORE YOU GOT MONEY TO BUY MORE.: NEVER TRUE

## 2025-03-28 SDOH — ECONOMIC STABILITY: TRANSPORTATION INSECURITY
IN THE PAST 12 MONTHS, HAS LACK OF TRANSPORTATION KEPT YOU FROM MEETINGS, WORK, OR FROM GETTING THINGS NEEDED FOR DAILY LIVING?: NO

## 2025-03-28 SDOH — ECONOMIC STABILITY: TRANSPORTATION INSECURITY
IN THE PAST 12 MONTHS, HAS THE LACK OF TRANSPORTATION KEPT YOU FROM MEDICAL APPOINTMENTS OR FROM GETTING MEDICATIONS?: NO

## 2025-03-28 SDOH — ECONOMIC STABILITY: FOOD INSECURITY: WITHIN THE PAST 12 MONTHS, THE FOOD YOU BOUGHT JUST DIDN'T LAST AND YOU DIDN'T HAVE MONEY TO GET MORE.: PATIENT DECLINED

## 2025-03-31 ENCOUNTER — OFFICE VISIT (OUTPATIENT)
Dept: PRIMARY CARE CLINIC | Age: 55
End: 2025-03-31
Payer: OTHER GOVERNMENT

## 2025-03-31 VITALS
TEMPERATURE: 97.7 F | OXYGEN SATURATION: 96 % | BODY MASS INDEX: 26.55 KG/M2 | DIASTOLIC BLOOD PRESSURE: 80 MMHG | HEIGHT: 72 IN | SYSTOLIC BLOOD PRESSURE: 130 MMHG | HEART RATE: 84 BPM | RESPIRATION RATE: 16 BRPM | WEIGHT: 196 LBS

## 2025-03-31 DIAGNOSIS — M54.16 LUMBAR RADICULOPATHY: Primary | ICD-10-CM

## 2025-03-31 DIAGNOSIS — D58.2 ELEVATED HEMOGLOBIN: ICD-10-CM

## 2025-03-31 DIAGNOSIS — G89.29 CHRONIC BILATERAL LOW BACK PAIN, UNSPECIFIED WHETHER SCIATICA PRESENT: ICD-10-CM

## 2025-03-31 DIAGNOSIS — M54.12 CERVICAL RADICULOPATHY: ICD-10-CM

## 2025-03-31 DIAGNOSIS — M54.2 NECK PAIN: ICD-10-CM

## 2025-03-31 DIAGNOSIS — M54.50 CHRONIC BILATERAL LOW BACK PAIN, UNSPECIFIED WHETHER SCIATICA PRESENT: ICD-10-CM

## 2025-03-31 PROCEDURE — 99213 OFFICE O/P EST LOW 20 MIN: CPT | Performed by: FAMILY MEDICINE

## 2025-04-03 ASSESSMENT — ENCOUNTER SYMPTOMS
DIARRHEA: 0
COLOR CHANGE: 0
VOMITING: 0
NAUSEA: 0
BACK PAIN: 1
CONSTIPATION: 0
ABDOMINAL PAIN: 0
RHINORRHEA: 0
COUGH: 0

## 2025-04-03 NOTE — PROGRESS NOTES
Performed by: [facility] LABORATORY Last Resulted: 12/13/24 09:02   Received From: [facility]  Result Received: 12/19/24 15:38    View Encounter        Received Information    PDMP Monitoring:    Last PDMP [place] as Reviewed ([place]):  Review User Review Instant Review Result            Urine Drug Screenings (1 yr)       Urine Drug Screen  Collected: 2/24/2017 11:41 AM (Final result)                  Medication Contract and Consent for Opioid Use Documents Filed        No documents found                     EMR Dragon/transcription disclaimer:  Much of this encounter note is electronic transcription/translation of spoken language toprinted texts.  The electronic translation of spoken language may be erroneous, or at times, nonsensical words or phrases may be inadvertently transcribed.  Although I have reviewed the note for such errors, some may still exist.    The patient (or guardian, if applicable) and other individuals in attendance with the patient were advised that Artificial Intelligence will be utilized during this visit to record, process the conversation to generate a clinical note, and support improvement of the AI technology. The patient (or guardian, if applicable) and other individuals in attendance at the appointment consented to the use of AI, including the recording.

## (undated) DEVICE — GLOVE SURG SZ 75 L12IN FNGR THK94MIL TRNSLUC YEL LTX

## (undated) DEVICE — Device: Brand: DEFENDO AIR/WATER/SUCTION AND BIOPSY VALVE

## (undated) DEVICE — SUTURE CHROMIC GUT SZ 4-0 L27IN ABSRB BRN L17MM RB-1 1/2 U203H

## (undated) DEVICE — SENSR O2 OXIMAX FNGR A/ 18IN NONSTR

## (undated) DEVICE — THE CHANNEL CLEANING BRUSH IS A NYLON FLEXI BRUSH ATTACHED TO A FLEXIBLE PLASTIC SHEATH DESIGNED TO SAFELY REMOVE DEBRIS FROM FLEXIBLE ENDOSCOPES.

## (undated) DEVICE — MASK,OXYGEN,MED CONC,ADLT,7' TUB, UC: Brand: PENDING

## (undated) DEVICE — SINU FOAM: Brand: SINU-FOAM

## (undated) DEVICE — CIRCUIT AD PLN SWVL WYE

## (undated) DEVICE — GELFOAM SZ 100 SPNG

## (undated) DEVICE — CODMAN® SURGICAL PATTIES 1/2" X 3" (1.27CM X 7.62CM): Brand: CODMAN®

## (undated) DEVICE — Device: Brand: OLYMPUS

## (undated) DEVICE — NEEDLE HYPO 18GA L1.5IN PNK POLYPR HUB S STL REG BVL STR

## (undated) DEVICE — DRAPE,EENT,SPLIT,STERILE: Brand: MEDLINE

## (undated) DEVICE — YANKAUER,BULB TIP WITH VENT: Brand: ARGYLE

## (undated) DEVICE — CUFF,BP,DISP,1 TUBE,ADULT,HP: Brand: MEDLINE

## (undated) DEVICE — MAJOR BSIN SETUP PK

## (undated) DEVICE — SUTURE PERMA-HAND SZ 2-0 L30IN NONABSORBABLE BLK L26MM SH K833H

## (undated) DEVICE — DEFOGGER!" ANTI FOG KIT: Brand: DEROYAL

## (undated) DEVICE — TUBING, SUCTION, 1/4" X 12', STRAIGHT: Brand: MEDLINE

## (undated) DEVICE — MASK ANES AD M SZ 5 PREM TAIL VLV INFL PRT UNSCENTED HK RNG

## (undated) DEVICE — NEEDLE HYPO 27GA L1.25IN GRY POLYPR HUB S STL REG BVL STR

## (undated) DEVICE — DRESSING 440402 MEROCEL 10PK STD 8CM: Brand: MEROCEL